# Patient Record
Sex: MALE | Race: BLACK OR AFRICAN AMERICAN | NOT HISPANIC OR LATINO | Employment: FULL TIME | URBAN - METROPOLITAN AREA
[De-identification: names, ages, dates, MRNs, and addresses within clinical notes are randomized per-mention and may not be internally consistent; named-entity substitution may affect disease eponyms.]

---

## 2017-03-29 ENCOUNTER — OFFICE VISIT (OUTPATIENT)
Dept: FAMILY MEDICINE | Facility: CLINIC | Age: 42
End: 2017-03-29
Payer: COMMERCIAL

## 2017-03-29 VITALS
TEMPERATURE: 98 F | WEIGHT: 202.63 LBS | OXYGEN SATURATION: 99 % | SYSTOLIC BLOOD PRESSURE: 118 MMHG | RESPIRATION RATE: 18 BRPM | HEART RATE: 88 BPM | BODY MASS INDEX: 28.37 KG/M2 | HEIGHT: 71 IN | DIASTOLIC BLOOD PRESSURE: 78 MMHG

## 2017-03-29 DIAGNOSIS — J30.89 NON-SEASONAL ALLERGIC RHINITIS DUE TO OTHER ALLERGIC TRIGGER: Primary | ICD-10-CM

## 2017-03-29 DIAGNOSIS — H61.21 IMPACTED CERUMEN OF RIGHT EAR: ICD-10-CM

## 2017-03-29 PROCEDURE — 99213 OFFICE O/P EST LOW 20 MIN: CPT | Mod: S$GLB,,, | Performed by: NURSE PRACTITIONER

## 2017-03-29 PROCEDURE — 1160F RVW MEDS BY RX/DR IN RCRD: CPT | Mod: S$GLB,,, | Performed by: NURSE PRACTITIONER

## 2017-03-29 PROCEDURE — 96372 THER/PROPH/DIAG INJ SC/IM: CPT | Mod: S$GLB,,, | Performed by: NURSE PRACTITIONER

## 2017-03-29 RX ORDER — TRIAMCINOLONE ACETONIDE 40 MG/ML
80 INJECTION, SUSPENSION INTRA-ARTICULAR; INTRAMUSCULAR
Status: COMPLETED | OUTPATIENT
Start: 2017-03-29 | End: 2017-03-29

## 2017-03-29 RX ORDER — FLUTICASONE PROPIONATE 50 MCG
1 SPRAY, SUSPENSION (ML) NASAL DAILY
Qty: 16 G | Refills: 0 | Status: SHIPPED | OUTPATIENT
Start: 2017-03-29 | End: 2018-06-20

## 2017-03-29 RX ORDER — LORATADINE 10 MG/1
10 TABLET ORAL DAILY
Qty: 30 TABLET | Refills: 1 | Status: SHIPPED | OUTPATIENT
Start: 2017-03-29 | End: 2018-06-20

## 2017-03-29 RX ADMIN — TRIAMCINOLONE ACETONIDE 80 MG: 40 INJECTION, SUSPENSION INTRA-ARTICULAR; INTRAMUSCULAR at 12:03

## 2017-03-29 NOTE — MR AVS SNAPSHOT
[facility]  [address] [place] [address]  Phone: [phone]  Fax: [phone]                  Onofre Sims   3/29/2017 11:00 AM   Office Visit    Description:  Male : 1975   Provider:  Lexus Gil NP   Department:  [facility]           Diagnoses this Visit        Comments    Non-seasonal allergic rhinitis due to other allergic trigger    -  Primary     Impacted cerumen of right ear                To Do List           Goals (5 Years of Data)     None       These Medications        Disp Refills Start End    fluticasone (FLONASE) 50 mcg/actuation nasal spray 16 g 0 3/29/2017     1 spray by Each Nare route once daily. - Each Nare    Pharmacy: [facility] [address] [place] [address] Ph #: [phone]       loratadine (CLARITIN) 10 mg tablet 30 tablet 1 3/29/2017 3/29/2018    Take 1 tablet (10 mg total) by mouth once daily. - Oral    Pharmacy: [address] Ph #: [phone]         [facility] -  Assistance  Registered nurses in the [facility] provide clinical advisement, health education, appointment booking, and other advisory services.  Call for this free service at [phone].             Medications           Message regarding Medications     Verify the changes and/or additions to your medication regime listed below are the same as discussed with your clinician today.  If any of these changes or additions are incorrect, please notify your healthcare provider.        START taking these NEW medications        Refills    fluticasone (FLONASE) 50 mcg/actuation nasal spray 0    Si spray by Each Nare route once daily.    Class: Normal    Route: Each Nare    loratadine (CLARITIN) 10 mg tablet 1    Sig: Take 1 tablet (10 mg total) by mouth once daily.    Class: Normal    Route: Oral      These medications were administered today   "      Dose Freq    triamcinolone acetonide injection 80 mg 80 mg Clinic/HOD 1 time    Sig: Inject 2 mLs (80 mg total) into the muscle one time.    Class: Normal    Route: Intramuscular      STOP taking these medications     atorvastatin (LIPITOR) 10 MG tablet Take 1 tablet (10 mg total) by mouth once daily.    azithromycin (Z-WILD) 250 MG tablet Take 2 tablets by mouth on day 1; Take 1 tablet by mouth on days 2-5           Verify that the below list of medications is an accurate representation of the medications you are currently taking.  If none reported, the list may be blank. If incorrect, please contact your healthcare provider. Carry this list with you in case of emergency.           Current Medications     fluticasone (FLONASE) 50 mcg/actuation nasal spray 1 spray by Each Nare route once daily.    loratadine (CLARITIN) 10 mg tablet Take 1 tablet (10 mg total) by mouth once daily.           Clinical Reference Information           Your Vitals Were     BP Pulse Temp Resp Height Weight    118/78 (BP Location: Left arm, Patient Position: Sitting, BP Method: Manual) 88 97.9 °F (36.6 °C) (Oral) 18 5' 11" (1.803 m) 91.9 kg (202 lb 9.6 oz)    SpO2 BMI             99% 28.26 kg/m2         Blood Pressure          Most Recent Value    BP  118/78      Allergies as of 3/29/2017     No Known Allergies      Immunizations Administered on Date of Encounter - 3/29/2017     None      [facility] Sign-Up     Activating your [facility] account is as easy as 1-2-3!     1) Visit [email or web address], select Sign Up Now, enter this activation code and your date of birth, then select Next.  [email or web address]  Expires: 5/13/2017 12:26 PM      2) Create a username and password to use when you visit [facility] in the future and select a security question in case you lose your password and select Next.    3) Enter your e-mail address and click Sign Up!    Additional Information  If you have questions, please e-mail [email or web address] or call " [phone] to talk to our [facility] staff. Remember, [facility] is NOT to be used for urgent needs. For medical emergencies, dial 911.         Language Assistance Services     ATTENTION: Language assistance services are available, free of charge. Please call [phone].      ATENCIÓN: Si baileyla elie, tiene a deluna disposición servicios gratuitos de asistencia lingüística. Llame al [phone].     CHÚ Ý: N?u b?n nói Ti?ng Vi?t, có các d?ch v? h? tr? ngôn ng? mi?n phí dành cho b?n. G?i s? [phone].         [facility] complies with applicable Federal civil rights laws and does not discriminate on the basis of race, color, national origin, age, disability, or sex.

## 2017-03-29 NOTE — LETTER
March 29, 2017      [address] [place] [address]  Phone: [phone]  Fax: [phone]       Patient: Onofre Sims   YOB: 1975  Date of Visit: 03/29/2017    To Whom It May Concern:    Onofre Lopez was at [facility] on 03/29/2017. He may return to work/school on 3/30/17 with no restrictions. If you have any questions or concerns, or if I can be of further assistance, please do not hesitate to contact me.    Sincerely,    Lexus Gil NP

## 2017-03-29 NOTE — PROGRESS NOTES
I attest that this patient was see and evaluated by TOMMY Fisher, then presented to me. I then examined the patient independently and together we agreed on diagnosis and treatment plan which was then presented to the patient See her note dated today for full visit information.         Subjective:        Patient ID: Onofre Sims is a 41 y.o. male.     Chief Complaint: URI     URI    This is a new problem. The current episode started in the past 7 days (3 days ago). The problem has been unchanged. There has been no fever. Associated symptoms include congestion, rhinorrhea, sneezing and a sore throat. Pertinent negatives include no abdominal pain, chest pain, coughing, diarrhea, dysuria, ear pain, headaches, joint swelling, nausea, neck pain, plugged ear sensation, rash, sinus pain, swollen glands, vomiting or wheezing. He has tried antihistamine (benadryl) for the symptoms. The treatment provided mild relief.      Review of Systems   Constitutional: Negative for fatigue and fever.   HENT: Positive for congestion, hearing loss, postnasal drip, rhinorrhea, sneezing and sore throat. Negative for ear discharge and ear pain.   Eyes: Positive for itching.   Respiratory: Negative for cough, shortness of breath and wheezing.   Cardiovascular: Negative for chest pain.   Gastrointestinal: Negative for abdominal pain, constipation, diarrhea, nausea and vomiting.   Genitourinary: Negative for difficulty urinating and dysuria.   Musculoskeletal: Negative for neck pain.   Skin: Negative for rash.   Neurological: Negative for headaches.       Objective:      Physical Exam   Constitutional: He is oriented to person, place, and time. He appears well-developed and well-nourished.   HENT:   Head: Normocephalic and atraumatic.   Left Ear: Tympanic membrane normal.   Nose: Mucosal edema and rhinorrhea present. Right sinus exhibits no maxillary sinus tenderness and no frontal sinus tenderness. Left sinus exhibits no maxillary sinus  tenderness and no frontal sinus tenderness.   Mouth/Throat: Uvula is midline. Posterior oropharyngeal erythema present. No oropharyngeal exudate or posterior oropharyngeal edema.   Right ear occluded with cerumen, unable to visualize TM   Neck: Neck supple. No JVD present.   Cardiovascular: Normal rate and regular rhythm.   Pulmonary/Chest: Effort normal and breath sounds normal.   Lymphadenopathy:   He has no cervical adenopathy.   Neurological: He is alert and oriented to person, place, and time.   Skin: Skin is warm and dry.   Psychiatric: He has a normal mood and affect. His behavior is normal. Judgment and thought content normal.   Vitals reviewed.      Assessment:       1. Non-seasonal allergic rhinitis due to other allergic trigger    2. Impacted cerumen of right ear        Plan:       Onofre was seen today for uri.     Diagnoses and all orders for this visit:     Non-seasonal allergic rhinitis due to other allergic trigger  - fluticasone (FLONASE) 50 mcg/actuation nasal spray; 1 spray by Each Nare route once daily.  - loratadine (CLARITIN) 10 mg tablet; Take 1 tablet (10 mg total) by mouth once daily.  - triamcinolone acetonide injection 80 mg; Inject 2 mLs (80 mg total) into the muscle one time.     Impacted cerumen of right ear     Right ear irrigated; large amount of cerumen removed. TM WNL; patient verbalized being able to hear much better             Electronically signed by Denia Justin at 3/29/2017 11:36 AM   Electronically signed by Denia Justin at 3/29/2017 12:55 PM

## 2017-07-17 ENCOUNTER — OFFICE VISIT (OUTPATIENT)
Dept: FAMILY MEDICINE | Facility: CLINIC | Age: 42
End: 2017-07-17
Payer: COMMERCIAL

## 2017-07-17 VITALS
HEIGHT: 71 IN | DIASTOLIC BLOOD PRESSURE: 82 MMHG | TEMPERATURE: 99 F | BODY MASS INDEX: 28.11 KG/M2 | WEIGHT: 200.81 LBS | SYSTOLIC BLOOD PRESSURE: 122 MMHG | OXYGEN SATURATION: 99 % | HEART RATE: 83 BPM

## 2017-07-17 DIAGNOSIS — D17.1 LIPOMA OF TORSO: Primary | ICD-10-CM

## 2017-07-17 DIAGNOSIS — N48.89 PENIS PAIN: ICD-10-CM

## 2017-07-17 DIAGNOSIS — Z00.00 ANNUAL PHYSICAL EXAM: ICD-10-CM

## 2017-07-17 PROCEDURE — 99214 OFFICE O/P EST MOD 30 MIN: CPT | Mod: S$GLB,,, | Performed by: NURSE PRACTITIONER

## 2017-07-17 RX ORDER — MUPIROCIN 20 MG/G
OINTMENT TOPICAL 3 TIMES DAILY
Qty: 30 G | Refills: 0 | Status: SHIPPED | OUTPATIENT
Start: 2017-07-17 | End: 2018-05-28 | Stop reason: ALTCHOICE

## 2017-07-17 NOTE — PROGRESS NOTES
Subjective:       Patient ID: Onofre Sims is a 41 y.o. male.    Chief Complaint: Annual Exam    Pt presents to the clinic today annual exam. Has paperwork for his insurance that needs to be filled out. Has a lump on the right side of back. Has been there for a year.  Has seen a doctor for it but they wanted to watch it. Cannot recall the MD name thinks it was Dr. Cotter. Denies pain to the area. No drainage. He just knows it there which is bothersome. Also the foreskin over the penis is inflamed. When he urinates the skin burns. Has been hurting since he had sex while his foreskin was wet. The problem is waxing and waning. Will come and go. States he feels the inflammation right now.         Review of Systems   Constitutional: Negative for chills, diaphoresis, fatigue and fever.   Eyes: Negative for photophobia and visual disturbance.   Respiratory: Negative for cough, chest tightness, shortness of breath and wheezing.    Cardiovascular: Negative for chest pain, palpitations and leg swelling.   Genitourinary: Positive for penile pain. Negative for decreased urine volume, dysuria, penile swelling and scrotal swelling.        Inflamed foreskin on penis     Skin: Negative for color change, pallor, rash and wound.        Lump on the right side of back     Neurological: Negative for dizziness, weakness, light-headedness and headaches.       Objective:      Physical Exam   Constitutional: He is oriented to person, place, and time. He appears well-developed and well-nourished. No distress.   HENT:   Head: Normocephalic.   Right Ear: Tympanic membrane and external ear normal.   Left Ear: Tympanic membrane and external ear normal.   Mouth/Throat: No oropharyngeal exudate, posterior oropharyngeal edema or posterior oropharyngeal erythema.   Eyes: Pupils are equal, round, and reactive to light.   Neck: Normal range of motion.   Cardiovascular: Normal rate, regular rhythm and normal heart sounds.    No murmur  heard.  Pulmonary/Chest: Effort normal and breath sounds normal. No respiratory distress. He has no wheezes.   Abdominal: Soft. Bowel sounds are normal. He exhibits no distension. There is no tenderness.   Genitourinary: Penis normal.         Musculoskeletal: Normal range of motion. He exhibits no edema.   Neurological: He is alert and oriented to person, place, and time.   Skin: Skin is warm and dry. He is not diaphoretic.        Psychiatric: He has a normal mood and affect. His behavior is normal. Judgment and thought content normal.   Vitals reviewed.      Assessment:       1. Lipoma of torso    2. Annual physical exam    3. Penis pain        Plan:       Lipoma of torso  -     Ambulatory referral to General Surgery    Annual physical exam  -     CBC auto differential; Future  -     Comprehensive metabolic panel; Future  -     Lipid panel; Future  -     TSH; Future  -     Urinalysis; Future    Penis pain  -     mupirocin (BACTROBAN) 2 % ointment; Apply topically 3 (three) times daily.  Dispense: 30 g; Refill: 0       Will call Dr Cotter to remove lipoma  Fasting blood work tomorrow  Will fax insurance paperwork

## 2017-07-19 ENCOUNTER — TELEPHONE (OUTPATIENT)
Dept: FAMILY MEDICINE | Facility: CLINIC | Age: 42
End: 2017-07-19

## 2017-07-19 DIAGNOSIS — E78.5 HYPERLIPIDEMIA, UNSPECIFIED HYPERLIPIDEMIA TYPE: Primary | ICD-10-CM

## 2017-07-19 LAB
ALBUMIN SERPL-MCNC: 4.6 G/DL (ref 3.6–5.1)
ALBUMIN/GLOB SERPL: 1.5 (CALC) (ref 1–2.5)
ALP SERPL-CCNC: 71 U/L (ref 40–115)
ALT SERPL-CCNC: 16 U/L (ref 9–46)
APPEARANCE UR: CLEAR
AST SERPL-CCNC: 23 U/L (ref 10–40)
BASOPHILS # BLD AUTO: 32 CELLS/UL (ref 0–200)
BASOPHILS NFR BLD AUTO: 0.7 %
BILIRUB SERPL-MCNC: 1 MG/DL (ref 0.2–1.2)
BILIRUB UR QL STRIP: NEGATIVE
BUN SERPL-MCNC: 14 MG/DL (ref 7–25)
BUN/CREAT SERPL: NORMAL (CALC) (ref 6–22)
CALCIUM SERPL-MCNC: 9.3 MG/DL (ref 8.6–10.3)
CHLORIDE SERPL-SCNC: 101 MMOL/L (ref 98–110)
CHOLEST SERPL-MCNC: 278 MG/DL (ref 125–200)
CHOLEST/HDLC SERPL: 7.7 (CALC)
CO2 SERPL-SCNC: 27 MMOL/L (ref 20–31)
COLOR UR: ABNORMAL
CREAT SERPL-MCNC: 1.23 MG/DL (ref 0.6–1.35)
EOSINOPHIL # BLD AUTO: 69 CELLS/UL (ref 15–500)
EOSINOPHIL NFR BLD AUTO: 1.5 %
ERYTHROCYTE [DISTWIDTH] IN BLOOD BY AUTOMATED COUNT: 11.2 % (ref 11–15)
GFR SERPL CREATININE-BSD FRML MDRD: 71 ML/MIN/1.73M2
GLOBULIN SER CALC-MCNC: 3.1 G/DL (CALC) (ref 1.9–3.7)
GLUCOSE SERPL-MCNC: 88 MG/DL (ref 65–99)
GLUCOSE UR QL STRIP: NEGATIVE
HCT VFR BLD AUTO: 37.5 % (ref 38.5–50)
HDLC SERPL-MCNC: 36 MG/DL
HGB BLD-MCNC: 12.6 G/DL (ref 13.2–17.1)
HGB UR QL STRIP: NEGATIVE
KETONES UR QL STRIP: ABNORMAL
LDLC SERPL CALC-MCNC: 222 MG/DL (CALC)
LEUKOCYTE ESTERASE UR QL STRIP: NEGATIVE
LYMPHOCYTES # BLD AUTO: 1219 CELLS/UL (ref 850–3900)
LYMPHOCYTES NFR BLD AUTO: 26.5 %
MCH RBC QN AUTO: 33.2 PG (ref 27–33)
MCHC RBC AUTO-ENTMCNC: 33.6 G/DL (ref 32–36)
MCV RBC AUTO: 98.7 FL (ref 80–100)
MONOCYTES # BLD AUTO: 354 CELLS/UL (ref 200–950)
MONOCYTES NFR BLD AUTO: 7.7 %
NEUTROPHILS # BLD AUTO: 2926 CELLS/UL (ref 1500–7800)
NEUTROPHILS NFR BLD AUTO: 63.6 %
NITRITE UR QL STRIP: NEGATIVE
NONHDLC SERPL-MCNC: 242 MG/DL (CALC)
PH UR STRIP: ABNORMAL [PH] (ref 5–8)
PLATELET # BLD AUTO: 251 THOUSAND/UL (ref 140–400)
PMV BLD REES-ECKER: 10.7 FL (ref 7.5–12.5)
POTASSIUM SERPL-SCNC: 4.2 MMOL/L (ref 3.5–5.3)
PROT SERPL-MCNC: 7.7 G/DL (ref 6.1–8.1)
PROT UR QL STRIP: NEGATIVE
RBC # BLD AUTO: 3.8 MILLION/UL (ref 4.2–5.8)
SODIUM SERPL-SCNC: 136 MMOL/L (ref 135–146)
SP GR UR STRIP: 1.03 (ref 1–1.03)
TRIGL SERPL-MCNC: 99 MG/DL
TSH SERPL-ACNC: 1.51 MIU/L (ref 0.4–4.5)
WBC # BLD AUTO: 4.6 THOUSAND/UL (ref 3.8–10.8)

## 2017-07-19 RX ORDER — ATORVASTATIN CALCIUM 20 MG/1
20 TABLET, FILM COATED ORAL DAILY
Qty: 90 TABLET | Refills: 0 | Status: SHIPPED | OUTPATIENT
Start: 2017-07-19 | End: 2018-06-20 | Stop reason: SDUPTHER

## 2017-07-19 NOTE — TELEPHONE ENCOUNTER
----- Message from Aparna Khan sent at 7/19/2017  8:24 AM CDT -----  Please give patient call with U/A results

## 2017-07-19 NOTE — TELEPHONE ENCOUNTER
Tried to call patient twice no answer. No voicemail. Blood work back everything looks good with the exception of his LDL (bad cholesterol) being elevated. I am going to send him a prescription for cholesterol medicine to the pharmacy. We can recheck the level in three months. Low fat low cholesterol diet and daily exercise. I also filled out the paperwork for his insurance he can pick it up at the . UA was normal

## 2017-07-19 NOTE — TELEPHONE ENCOUNTER
Tried to call patient twice no answer. No voicemail. Blood work back everything looks good with the exception of his LDL (bad cholesterol) being elevated. I am going to send him a prescription for cholesterol medicine to the pharmacy. We can recheck the level in three months. Low fat low cholesterol diet and daily exercise. I also filled out the paperwork for his insurance he can pick it up at the .

## 2017-07-19 NOTE — TELEPHONE ENCOUNTER
Spoke with patient  Blood work back everything looks good with the exception of his LDL (bad cholesterol) being elevated. I am going to send him a prescription for cholesterol medicine to the pharmacy. We can recheck the level in three months. Low fat low cholesterol diet and daily exercise. I also filled out the paperwork for his insurance he can pick it up at the . UA was normal

## 2017-07-25 ENCOUNTER — TELEPHONE (OUTPATIENT)
Dept: FAMILY MEDICINE | Facility: CLINIC | Age: 42
End: 2017-07-25

## 2017-07-25 NOTE — TELEPHONE ENCOUNTER
Spoke with patient states him and his wife had intercourse and she complained of it burning he did have the Bactroban on his penis during the intercourse wants to know if we tested for infection. Let him know we did do a UA which was normal but we did not do STD testing. Let him know if he wanted STD testing I could order it. States if it happens again he will let me know and then go forth with the testing

## 2017-07-25 NOTE — TELEPHONE ENCOUNTER
----- Message from Ashleigh Glass sent at 7/24/2017  4:58 PM CDT -----  Contact: Pt [phone]  Patient requesting a call from Dima

## 2017-07-28 ENCOUNTER — TELEPHONE (OUTPATIENT)
Dept: FAMILY MEDICINE | Facility: CLINIC | Age: 42
End: 2017-07-28

## 2017-07-28 DIAGNOSIS — Z20.2 POSSIBLE EXPOSURE TO STD: Primary | ICD-10-CM

## 2017-07-28 NOTE — TELEPHONE ENCOUNTER
----- Message from Delfina Rose sent at 7/28/2017  9:16 AM CDT -----  Contact: PT  Pt states he had an appt with you last week with wellness check up and issues with his foreskin on penis. Pt states he is using ointment but continue to have problems. Pt is requesting to be screened for STD. Pt would like to speak with Lexus today and also have the labs done today.

## 2017-07-28 NOTE — TELEPHONE ENCOUNTER
STD order in to be done at [facility].. If the problem continues he can follow up with urology

## 2017-08-14 ENCOUNTER — TELEPHONE (OUTPATIENT)
Dept: FAMILY MEDICINE | Facility: CLINIC | Age: 42
End: 2017-08-14

## 2017-12-28 ENCOUNTER — TELEPHONE (OUTPATIENT)
Dept: FAMILY MEDICINE | Facility: CLINIC | Age: 42
End: 2017-12-28

## 2017-12-28 NOTE — TELEPHONE ENCOUNTER
----- Message from Aparna Khan sent at 12/28/2017  8:33 AM CST -----  Patient requesting antibiotic (Zpak). Has symptoms of sore throat, nasal/head congestion. No fever. Started two days ago. Taking OTC Nyquil sinus    [facility]

## 2018-05-28 ENCOUNTER — INITIAL CONSULT (OUTPATIENT)
Dept: OPHTHALMOLOGY | Facility: CLINIC | Age: 43
End: 2018-05-28
Payer: COMMERCIAL

## 2018-05-28 DIAGNOSIS — H18.603 KERATOCONUS OF BOTH EYES: Primary | ICD-10-CM

## 2018-05-28 DIAGNOSIS — H52.213 IRREGULAR ASTIGMATISM OF BOTH EYES: ICD-10-CM

## 2018-05-28 PROCEDURE — 92004 COMPRE OPH EXAM NEW PT 1/>: CPT | Mod: S$GLB,,, | Performed by: OPHTHALMOLOGY

## 2018-05-28 PROCEDURE — 92025 CPTRIZED CORNEAL TOPOGRAPHY: CPT | Mod: S$GLB,,, | Performed by: OPHTHALMOLOGY

## 2018-05-28 PROCEDURE — 99999 PR PBB SHADOW E&M-EST. PATIENT-LVL II: CPT | Mod: PBBFAC,,, | Performed by: OPHTHALMOLOGY

## 2018-05-28 NOTE — Clinical Note
Eli / Binta - pls schedule for CXL.  I placed CXL order - need to f/up and make sure this is routed correctly.

## 2018-05-28 NOTE — Clinical Note
May 31, 2018      Dean Dale MD  [address]  [address]  [place] [address]           [facility]  [address]  [place] [address]  Phone: [phone]  Fax: [phone]          Patient: Onofre Sims   MR Number: [id number]   YOB: 1975   Date of Visit: 5/28/2018       Dear Dr. Dean Dale:    Thank you for referring Onofre Sims to me for evaluation. Attached you will find relevant portions of my assessment and plan of care.    If you have questions, please do not hesitate to call me. I look forward to following Onofre Sims along with you.    Sincerely,    Garett Carter    Enclosure  CC:  No Recipients    If you would like to receive this communication electronically, please contact [email or web address] or [phone] to request more information on [employer] Link access.    For providers and/or their staff who would like to refer a patient to [facility], please contact us through our one-stop-shop provider referral line, Segundo Darnell, at [phone].    If you feel you have received this communication in error or would no longer like to receive these types of communications, please e-mail [email or web address]

## 2018-05-28 NOTE — LETTER
[facility]  [address]  [facility] [address]  Phone: [phone]  Fax: [phone]   June 1, 2018    Dean Dale MD  [address]  [address]  [place] [address]    Patient: Onofre Sims   MR Number: [id number]   YOB: 1975   Date of Visit: 5/28/2018       Dear Dr. Dale:    Thank you for referring Onofre Sims to me for evaluation. Here is my assessment and plan of care:      KERATOCONUS OS>>OD    Reviewed old notes from Dr. Dale's office dating back to 2012.   Agree with assessment that numbers show progression on topography from 2004 to 2009 but no recent evidence of progression on rima.  Some recent changes in cyl of Arx/ Mrx.      Pt tolerating CL well OD, not well OS. Discussed the possibility that CXL OS may allow for better RGP fit - or conversely pt may go on to need PKP OS. Will discuss with Dr. Dale.     The diagnosis of corneal ectasia and its etiology and clinical course were discussed. Treatment with the use of specialty contact lenses, collagen cross linking, and corneal transplantation was explained in detail.     This patient would be a good candidate for now FDA approved epi-off collagen cross linking OD>OS.  R/B/A discussed. Goal is to prevent progression of ectasia.  Pt will be fit with specialty CL after cornea has healed.       Below you will find my full exam findings. If you have questions, please do not hesitate to call me. I look forward to following Mr. Onofre Sims along with you.    Sincerely,        Yoon Strange MD       CC  No Recipients             Base Eye Exam     Visual Acuity (Snellen - Linear)       Right Left    Dist sc CF at 3' CF at 3'    Dist cc 20/40 20/70          Pachymetry       Right Left    Thickness 499 504          Pupils       Pupils APD    Right PERRL None    Left PERRL None          Neuro/Psych     Oriented x3:  Yes    Mood/Affect:  Normal            Slit Lamp and Fundus Exam     Slit Lamp Exam        Right Left    Lids/Lashes Normal Normal    Conjunctiva/Sclera White and quiet White and quiet    Cornea inf steepening inf steepening, striae, tr apical scarring     Anterior Chamber Deep and quiet Deep and quiet    Iris Round and reactive Round and reactive    Lens Clear Clear    Vitreous Normal Normal          Fundus Exam       Right Left    Disc Normal Normal    C/D Ratio 0.5 0.5            Refraction     Wearing Rx       Sphere Cylinder Axis    Right -16.75 +2.50 153    Left -14.25 +4.50 025          Manifest Refraction (Auto)       Sphere Cylinder Axis    Right -14.00 +1.00 146    Left -17.00 +8.00 024

## 2018-06-01 NOTE — PROGRESS NOTES
HPI     Referral dr. Dale    CXL eval for KCN - dx'ed around the age of 28, wears RGPs    Patient states va is about the same. No noticeable changes.    Last edited by Yoon Strange MD on 5/28/2018  9:24 AM. (History)            Assessment /Plan     For exam results, see Encounter Report.    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 Dale's office dating back to 2012.   Agree with assessment that numbers show progression on topography from 2004 to 2009.  Also evidence of recent increase in cyl of Arx/ Mrx.      Pt not tolerating RGP well OS. Discussed the possibility that CXL OS may allow for better RGP fit - or conversely pt may go on to need PKP OS. Will discuss with Dr. Dale.     The diagnosis of corneal ectasia and its etiology and clinical course were discussed. Treatment with the use of specialty contact lenses, collagen cross linking, and corneal transplantation was explained in detail.     This patient would be an excellent candidate for now FDA approved epi-off collagen cross linking OD>OS.  R/B/A discussed. Goal is to prevent progression of ectasia.  Pt will be fit with specialty CL after cornea has healed.     This patient exhibits signs of progression of keratoconus and failure of conservative treatments with spectacles and/or contact lenses.   Disease progression is indicated by:  - An increase of >1D in the Kmax  - An increase of >1D of astigmatism in the MRx  - An increase in myopia of >0.50D on MRx     I recommend treatment with Collagen Crosslinking using the Avedro FDA approved epi-off protocol with Photrexa and the KXL System, in order to stabilize this condition.     Plan:   KXL treatment OS 1st  advanced KCN CCT  499/501     I have informed the patient that we will seek insurance pre-approval, but in case of failure of the  insurance company to cover the cost of this medically necessary procedure, there may be a cost of $2,000 for the patient.

## 2018-06-15 ENCOUNTER — TELEPHONE (OUTPATIENT)
Dept: OPHTHALMOLOGY | Facility: CLINIC | Age: 43
End: 2018-06-15

## 2018-06-15 NOTE — TELEPHONE ENCOUNTER
Called and left 3rd VM. Confirmed we would schedule OS for CXL. Goal is to have CL fit better post procedure. If it does not - may need PKP with dr. Dale    Pt to call back with any other questions for gianna/ syd.

## 2018-06-20 ENCOUNTER — OFFICE VISIT (OUTPATIENT)
Dept: FAMILY MEDICINE | Facility: CLINIC | Age: 43
End: 2018-06-20
Payer: COMMERCIAL

## 2018-06-20 VITALS
SYSTOLIC BLOOD PRESSURE: 118 MMHG | WEIGHT: 201.19 LBS | BODY MASS INDEX: 28.17 KG/M2 | HEIGHT: 71 IN | TEMPERATURE: 99 F | DIASTOLIC BLOOD PRESSURE: 76 MMHG | HEART RATE: 72 BPM | OXYGEN SATURATION: 98 %

## 2018-06-20 DIAGNOSIS — R79.89 ELEVATED LFTS: ICD-10-CM

## 2018-06-20 DIAGNOSIS — E78.5 HYPERLIPIDEMIA, UNSPECIFIED HYPERLIPIDEMIA TYPE: ICD-10-CM

## 2018-06-20 DIAGNOSIS — Z00.00 WELLNESS EXAMINATION: Primary | ICD-10-CM

## 2018-06-20 DIAGNOSIS — H18.603 KERATOCONUS OF BOTH EYES: ICD-10-CM

## 2018-06-20 DIAGNOSIS — D17.1 LIPOMA OF TORSO: ICD-10-CM

## 2018-06-20 PROCEDURE — 99396 PREV VISIT EST AGE 40-64: CPT | Mod: 25,S$GLB,, | Performed by: FAMILY MEDICINE

## 2018-06-20 PROCEDURE — 90471 IMMUNIZATION ADMIN: CPT | Mod: S$GLB,,, | Performed by: FAMILY MEDICINE

## 2018-06-20 PROCEDURE — 90715 TDAP VACCINE 7 YRS/> IM: CPT | Mod: S$GLB,,, | Performed by: FAMILY MEDICINE

## 2018-06-20 RX ORDER — ATORVASTATIN CALCIUM 20 MG/1
20 TABLET, FILM COATED ORAL DAILY
Qty: 90 TABLET | Refills: 1 | Status: SHIPPED | OUTPATIENT
Start: 2018-06-20 | End: 2019-03-21 | Stop reason: SDUPTHER

## 2018-06-20 NOTE — PROGRESS NOTES
Subjective:      Patient ID: Onofre Sims is a 42 y.o. male.    Chief Complaint: Follow-up (wellness)      HPI   Wellness; went to [employer] in [place] sent BC/BS at work at [facility] [employer], told BP and scales were broke and they giovany blood, got results; also, will have a cross link procedure for keratoconus; on labs, resumed statin since labs done with high choleserol; AST/ALT; drinks daiquiris;   W  Review of Systems   Constitutional: Negative for appetite change, fatigue, fever and unexpected weight change.   HENT: Negative for congestion, ear pain, sinus pressure and sore throat.    Eyes: Positive for visual disturbance. Negative for pain.   Respiratory: Negative for shortness of breath.    Cardiovascular: Negative for chest pain.   Gastrointestinal: Negative for abdominal pain, constipation and diarrhea.   Endocrine: Negative for polyuria.   Genitourinary: Negative for difficulty urinating and frequency.   Musculoskeletal: Negative for arthralgias, back pain and myalgias.   Skin: Negative for color change.   Allergic/Immunologic: Negative.    Neurological: Negative for syncope, weakness and headaches.   Hematological: Does not bruise/bleed easily.   Psychiatric/Behavioral: Negative for dysphoric mood and suicidal ideas. The patient is not nervous/anxious.    All other systems reviewed and are negative.    Objective:     Physical Exam   Constitutional: He is oriented to person, place, and time. He appears well-developed and well-nourished. No distress.   HENT:   Head: Normocephalic and atraumatic.   Right Ear: External ear normal.   Left Ear: External ear normal.   Mouth/Throat: Oropharynx is clear and moist. No oropharyngeal exudate.   Eyes: Conjunctivae and EOM are normal. Pupils are equal, round, and reactive to light. Right eye exhibits no discharge. Left eye exhibits no discharge. No scleral icterus.   Neck: Normal range of motion. Neck supple. No JVD present. No tracheal deviation present. No thyromegaly present.    Cardiovascular: Normal rate and regular rhythm.  Exam reveals no gallop and no friction rub.    No murmur heard.  Pulmonary/Chest: Effort normal and breath sounds normal. No stridor. No respiratory distress. He has no wheezes. He has no rales.         He exhibits no tenderness.   Abdominal: Soft. He exhibits no distension and no mass. There is no tenderness. There is no rebound and no guarding.   Musculoskeletal: Normal range of motion. He exhibits no edema or tenderness.   Lymphadenopathy:     He has no cervical adenopathy.   Neurological: He is alert and oriented to person, place, and time. He has normal reflexes. He displays normal reflexes. No cranial nerve deficit. He exhibits normal muscle tone. Coordination normal.   Skin: Skin is warm and dry. No rash noted. He is not diaphoretic. No erythema. No pallor.   Psychiatric: He has a normal mood and affect. His behavior is normal. Judgment and thought content normal.   Nursing note and vitals reviewed.    Assessment:     1. Wellness examination    2. Keratoconus of both eyes    3. Elevated LFTs    4. Hyperlipidemia, unspecified hyperlipidemia type    5. Lipoma of torso      Plan:     New Prescriptions    No medications on file     Discontinued Medications    FLUTICASONE (FLONASE) 50 MCG/ACTUATION NASAL SPRAY    1 spray by Each Nare route once daily.    LORATADINE (CLARITIN) 10 MG TABLET    Take 1 tablet (10 mg total) by mouth once daily.     Modified Medications    Modified Medication Previous Medication    ATORVASTATIN (LIPITOR) 20 MG TABLET atorvastatin (LIPITOR) 20 MG tablet       Take 1 tablet (20 mg total) by mouth once daily.    Take 1 tablet (20 mg total) by mouth once daily.       Wellness examination  -     CBC auto differential; Future; Expected date: 06/20/2018    Keratoconus of both eyes  -     CBC auto differential; Future; Expected date: 06/20/2018    Elevated LFTs  -     Hepatic function panel; Future; Expected date: 07/04/2018  -     CBC auto  differential; Future; Expected date: 06/20/2018    Hyperlipidemia, unspecified hyperlipidemia type  -     atorvastatin (LIPITOR) 20 MG tablet; Take 1 tablet (20 mg total) by mouth once daily.  Dispense: 90 tablet; Refill: 1  -     Lipid panel; Future; Expected date: 09/20/2018  -     CBC auto differential; Future; Expected date: 06/20/2018    Lipoma of torso  Comments:  right mid back, 6 cm on June 20, 2018  Orders:  -     CBC auto differential; Future; Expected date: 06/20/2018    Other orders  -     Tdap Vaccine    lay lacie andersen, repeat lft 2 weeks, repeat lipid 3 months after staying on atorvastatin  To [facility] for lipoma

## 2018-06-25 ENCOUNTER — TELEPHONE (OUTPATIENT)
Dept: OPHTHALMOLOGY | Facility: CLINIC | Age: 43
End: 2018-06-25

## 2018-06-25 NOTE — TELEPHONE ENCOUNTER
----- Message from Yoon Strange MD sent at 6/15/2018  4:44 PM CDT -----  Spoke with this pt. He understands plan. pls schedule so we can determine if insurance will cover. Thanks.    ----- Message -----  From: Eli Benavides MA  Sent: 6/15/2018   3:50 PM  To: Yoon Strange MD    Pt called back.  He wants to speak to you.  He has detailed questions.

## 2018-07-11 ENCOUNTER — TELEPHONE (OUTPATIENT)
Dept: OPHTHALMOLOGY | Facility: CLINIC | Age: 43
End: 2018-07-11

## 2018-07-11 NOTE — TELEPHONE ENCOUNTER
Called pt to schedule CXL procedure.  Pt wanted to know what it is going to cost before scheduling and submitting it to his insurance company because he will be switching over insurances in a few months and doesn't want it submitted twice.  Advised pt that I wasn't sure of a price until it was submitted to see if and how much the insurance covered.  Pt requested to speak to Codie. Sent Codie a message to contact pt and also sent Dr Strange a message with this information.

## 2018-07-26 ENCOUNTER — TELEPHONE (OUTPATIENT)
Dept: OPHTHALMOLOGY | Facility: CLINIC | Age: 43
End: 2018-07-26

## 2018-07-26 NOTE — TELEPHONE ENCOUNTER
----- Message from Agatha Salas sent at 7/26/2018  3:42 PM CDT -----  Contact: Onofre  Mr. Sims needs to have a refit for speciality lens. The only appointments that are available is late September. He can be reached at [phone]

## 2018-07-31 ENCOUNTER — TELEPHONE (OUTPATIENT)
Dept: OPTOMETRY | Facility: CLINIC | Age: 43
End: 2018-07-31

## 2018-07-31 NOTE — TELEPHONE ENCOUNTER
----- Message from Joslyn Platt sent at 7/31/2018 10:03 AM CDT -----  Contact: Onofre Araujo calling to schedule for specialty fit lens appt with Dr. Castrejon.  He has been trying to get scheduled since last Thursday and says no one called him back.  According to the notes from 07/26 Binta was to get with you to schedule.  He can be reached at [phone]

## 2018-08-01 ENCOUNTER — TELEPHONE (OUTPATIENT)
Dept: OPHTHALMOLOGY | Facility: CLINIC | Age: 43
End: 2018-08-01

## 2018-08-01 NOTE — TELEPHONE ENCOUNTER
Received an e-mail relaying patient concern about visit on 5/28/18.  Mr. Sims has stated that he feels he received inadequate service from the physician he saw and wanted to discusses with management.  I called Mr. Sims and listened to his concerns.  He relayed to me that he feels uncomfortable with the exam he received. His top concerns were that his previous records were not available during his exam and that Dr. Strange wanted to schedule surgery for his right eye.  Mr. Sims was told by his previous ophthalmologist that the left eye needed surgery first.  He did not agree with Dr. Strange's advised for right eye first and was feeling very uncomfortable with proceeding with care with Dr. Strange.  I asked how he would like for me to help him move forward with getting the appropriate care.  I advised that we have another corneal specialist on staff that could offer an opinion and possible schedule surgery with Dr. Nunes.  He was agreeable with an appointment with Dr. Nunes and asked for an appointment with Dr. Castrejon for special contact lens fit.  Both appointments were scheduled and Mr. Sims is satisfied with the outcome.

## 2018-08-10 ENCOUNTER — OFFICE VISIT (OUTPATIENT)
Dept: OPTOMETRY | Facility: CLINIC | Age: 43
End: 2018-08-10
Payer: COMMERCIAL

## 2018-08-10 DIAGNOSIS — H52.213 IRREGULAR ASTIGMATISM OF BOTH EYES: ICD-10-CM

## 2018-08-10 DIAGNOSIS — H18.603 KERATOCONUS OF BOTH EYES: Primary | ICD-10-CM

## 2018-08-10 PROCEDURE — 92072 FITG C-LENS KERATOCONUS 1ST: CPT | Mod: S$GLB,,, | Performed by: OPTOMETRIST

## 2018-08-10 PROCEDURE — 99999 PR PBB SHADOW E&M-EST. PATIENT-LVL II: CPT | Mod: PBBFAC,,, | Performed by: OPTOMETRIST

## 2018-08-10 PROCEDURE — 92015 DETERMINE REFRACTIVE STATE: CPT | Mod: S$GLB,,, | Performed by: OPTOMETRIST

## 2018-08-10 PROCEDURE — 92014 COMPRE OPH EXAM EST PT 1/>: CPT | Mod: S$GLB,,, | Performed by: OPTOMETRIST

## 2018-08-10 NOTE — PROGRESS NOTES
HPI     Pt is here for contact lens fit. [facility] Exam  KXL candidate Dr. Nunes  Left eye poor comfort with GPs        Last edited by Jarrett Castrejon, OD on 8/10/2018  3:04 PM. (History)            Assessment /Plan     For exam results, see Encounter Report.    Keratoconus of both eyes  Irregular astigmatism of both eyes  Eyeglass Final Rx     Eyeglass Final Rx       Sphere    Right NONE    Left NONE              Contact Lens Final Rx     Final Contact Lens Rx       Brand Base Curve Diameter Sphere Cylinder Lens Addl. Specs    Right Synergeyes VS 8.4 16.0 -0.50 Sphere 3600 36-42 Menicon Z    Left Synergeyes VS 8.4 16.0 -4.25 Sphere 3800 36-42 Menicon Z    Comments:  This is not a final prescription.  This is specialty order contact lens that requires follow up care and warranty adjustment.                     RTC dispense and train

## 2018-08-10 NOTE — LETTER
August 10, 2018      Justin Nunes MD  [address]  [address]  [facility] [address]           [facility]  [address]  [facility] [address]  Phone: [phone]  Fax: [phone]          Patient: Onofre Sims   MR Number: [id number]   YOB: 1975   Date of Visit: 8/10/2018       Dear Dr. Justin Nunes:    Thank you for referring Onofre Sims to me for evaluation. Attached you will find relevant portions of my assessment and plan of care.    If you have questions, please do not hesitate to call me. I look forward to following Onofre Sims along with you.    Sincerely,    Jarrett Castrejon, OD    Enclosure  CC:  No Recipients    If you would like to receive this communication electronically, please contact [email or web address] or [phone] to request more information on [employer] access.    For providers and/or their staff who would like to refer a patient to [facility], please contact us through our one-stop-shop provider referral line, [facility], at [phone].    If you feel you have received this communication in error or would no longer like to receive these types of communications, please e-mail [email or web address]

## 2018-08-15 NOTE — MEDICAL/APP STUDENT
Subjective:       Patient ID: Onofre Sims is a 41 y.o. male.    Chief Complaint: URI    URI    This is a new problem. The current episode started in the past 7 days (3 days ago). The problem has been unchanged. There has been no fever. Associated symptoms include congestion, rhinorrhea, sneezing and a sore throat. Pertinent negatives include no abdominal pain, chest pain, coughing, diarrhea, dysuria, ear pain, headaches, joint swelling, nausea, neck pain, plugged ear sensation, rash, sinus pain, swollen glands, vomiting or wheezing. He has tried antihistamine (benadryl) for the symptoms. The treatment provided mild relief.     Review of Systems   Constitutional: Negative for fatigue and fever.   HENT: Positive for congestion, hearing loss, postnasal drip, rhinorrhea, sneezing and sore throat. Negative for ear discharge and ear pain.    Eyes: Positive for itching.   Respiratory: Negative for cough, shortness of breath and wheezing.    Cardiovascular: Negative for chest pain.   Gastrointestinal: Negative for abdominal pain, constipation, diarrhea, nausea and vomiting.   Genitourinary: Negative for difficulty urinating and dysuria.   Musculoskeletal: Negative for neck pain.   Skin: Negative for rash.   Neurological: Negative for headaches.       Objective:      Physical Exam   Constitutional: He is oriented to person, place, and time. He appears well-developed and well-nourished.   HENT:   Head: Normocephalic and atraumatic.   Left Ear: Tympanic membrane normal.   Nose: Mucosal edema and rhinorrhea present. Right sinus exhibits no maxillary sinus tenderness and no frontal sinus tenderness. Left sinus exhibits no maxillary sinus tenderness and no frontal sinus tenderness.   Mouth/Throat: Uvula is midline. Posterior oropharyngeal erythema present. No oropharyngeal exudate or posterior oropharyngeal edema.   Right ear occluded with cerumen, unable to visualize TM   Neck: Neck supple. No JVD present.   Cardiovascular:  Will wait for lab results before treatments   Normal rate and regular rhythm.    Pulmonary/Chest: Effort normal and breath sounds normal.   Lymphadenopathy:     He has no cervical adenopathy.   Neurological: He is alert and oriented to person, place, and time.   Skin: Skin is warm and dry.   Psychiatric: He has a normal mood and affect. His behavior is normal. Judgment and thought content normal.   Vitals reviewed.      Assessment:       1. Non-seasonal allergic rhinitis due to other allergic trigger    2. Impacted cerumen of right ear        Plan:       Onofre was seen today for uri.    Diagnoses and all orders for this visit:    Non-seasonal allergic rhinitis due to other allergic trigger  -     fluticasone (FLONASE) 50 mcg/actuation nasal spray; 1 spray by Each Nare route once daily.  -     loratadine (CLARITIN) 10 mg tablet; Take 1 tablet (10 mg total) by mouth once daily.  -     triamcinolone acetonide injection 80 mg; Inject 2 mLs (80 mg total) into the muscle one time.    Impacted cerumen of right ear    Right ear irrigated; large amount of cerumen removed. TM WNL; patient verbalized being able to hear much better

## 2018-09-04 ENCOUNTER — INITIAL CONSULT (OUTPATIENT)
Dept: OPHTHALMOLOGY | Facility: CLINIC | Age: 43
End: 2018-09-04
Attending: OPHTHALMOLOGY
Payer: COMMERCIAL

## 2018-09-04 DIAGNOSIS — H18.603 KERATOCONUS OF BOTH EYES: Primary | ICD-10-CM

## 2018-09-04 PROCEDURE — 92014 COMPRE OPH EXAM EST PT 1/>: CPT | Mod: S$GLB,,, | Performed by: OPHTHALMOLOGY

## 2018-09-04 PROCEDURE — 99999 PR PBB SHADOW E&M-EST. PATIENT-LVL II: CPT | Mod: PBBFAC,,, | Performed by: OPHTHALMOLOGY

## 2018-09-04 NOTE — Clinical Note
September 4, 2018      Dean Dale MD  [address]  [address]  [place] [address]           [facility]  [address]  [facility] [address]  Phone: [phone]  Fax: [phone]          Patient: Onofre Sims   MR Number: [id number]   YOB: 1975   Date of Visit: 9/4/2018       Dear Dr. Dean Dale:    Thank you for referring Onofre Sims to me for evaluation. Attached you will find relevant portions of my assessment and plan of care.    If you have questions, please do not hesitate to call me. I look forward to following Onofre Sims along with you.    Sincerely,    Justin Nunes MD    Enclosure  CC:  No Recipients    If you would like to receive this communication electronically, please contact [email or web address] or [phone] to request more information on [employer] Link access.    For providers and/or their staff who would like to refer a patient to [facility], please contact us through our one-stop-shop provider referral line, [facility], at [phone].    If you feel you have received this communication in error or would no longer like to receive these types of communications, please e-mail [email or web address]

## 2018-09-04 NOTE — Clinical Note
Please clear his billing from his first visit with Dr Strange, so that he only receives one charge for these two visits.

## 2018-09-04 NOTE — LETTER
[facility] ([facility]  Ophthalmology  [address]  [facility] [address]  Phone: [phone]  Fax: [phone]   September 4, 2018    Dean Dale MD  [address]  [facility] [address]    Patient: Onofre Sims   MR Number: [id number]   YOB: 1975   Date of Visit: 9/4/2018     Dear Dr. Dale :    Thank you for referring Onofre Sims to me for evaluation. Here is my assessment and plan of care:    Keratoconus of both eyes    The diagnosis of keratoconus and its etiology and clinical course were discussed.  Treatment with the use of specialty contact lenses, collagen cross linking, and corneal transplantation was explained in detail.    This patient exhibits minimal signs of progression of keratoconus over the past 3-4 years.    I recommend consider treatment with Collagen Crosslinking using the Avedro FDA approved epi-off protocol with Photrexa and the KXL System, in order to stabilize this condition.  I have informed the patient that we will seek insurance pre-approval, but in case of failure of the insurance company to cover the cost of this medically necessary procedure, there may be a cost of $2,000 for the patient.    Plan:   KXL treatment OS then OD.  336/447  Discussed possibility of careful monitoring for progression OD as pt is 41 y/o and insurance coverage is unlikely.  He wished to monitor at this time, get insurance issues shored up, and consider CXL treatment in the future.     If you have questions, please do not hesitate to call me. I look forward to following Onofre Sims along with you.    Sincerely,    Justin Nunes MD       CC  No Recipients

## 2018-09-04 NOTE — PROGRESS NOTES
HPI     Referred by Dr. Dale    Patient here for a CXL evaluation. Patient states he is here for a 2nd   opinion about is Keratoconus. He says he was dx'd in his 20's. Denies   pain.     No eye meds this time.     Last edited by Karrie Centeno on 9/4/2018 10:50 AM. (History)            Assessment /Plan     For exam results, see Encounter Report.    Keratoconus of both eyes        The diagnosis of keratoconus and its etiology and clinical course were discussed.  Treatment with the use of specialty contact lenses, collagen cross linking, and corneal transplantation was explained in detail.    This patient exhibits minimal signs of progression of keratoconus over the past 3-4 years.    I recommend consider treatment with Collagen Crosslinking using the Avedro FDA approved epi-off protocol with Photrexa and the KXL System, in order to stabilize this condition.  I have informed the patient that we will seek insurance pre-approval, but in case of failure of the insurance company to cover the cost of this medically necessary procedure, there may be a cost of $2,000 for the patient.    Plan:   KXL treatment OS then OD.  336/447  Discussed possibility of careful monitoring for progression OD as pt is 43 y/o and insurance coverage is unlikely.  He wished to monitor at this time, get insurance issues shored up, and consider CXL treatment in the future.

## 2018-10-12 ENCOUNTER — OFFICE VISIT (OUTPATIENT)
Dept: OPTOMETRY | Facility: CLINIC | Age: 43
End: 2018-10-12
Payer: COMMERCIAL

## 2018-10-12 DIAGNOSIS — H52.213 IRREGULAR ASTIGMATISM OF BOTH EYES: Primary | ICD-10-CM

## 2018-10-12 PROCEDURE — 99499 UNLISTED E&M SERVICE: CPT | Mod: S$GLB,,, | Performed by: OPTOMETRIST

## 2018-10-12 PROCEDURE — 92499 UNLISTED OPH SVC/PROCEDURE: CPT | Mod: ,,, | Performed by: OPTOMETRIST

## 2018-10-12 NOTE — PROGRESS NOTES
HPI     Dispense and train with Yessica Shaw comfort OD, lens awareness OS    Last edited by Jarrett Castrejon, OD on 10/12/2018  3:14 PM. (History)            Assessment /Plan     For exam results, see Encounter Report.    Irregular astigmatism of both eyes  --Ok to dispense Scleral CLs  Cleaning: ClearCare Plus        Disinfection and Storage: ClearCare Plus  Rinsing:  Purilens  Lens Haskins: Purilens  Rewetting: Blink Contact Lenses     Wearing Time Schedule Day 1        3-4 hours    2  Up to 6 hours    3  Up to 8 hours    4  Up to 10 hours    5  Up to 10 hours max          RTC 1 wk                 
Statement Selected

## 2018-10-19 ENCOUNTER — OFFICE VISIT (OUTPATIENT)
Dept: OPTOMETRY | Facility: CLINIC | Age: 43
End: 2018-10-19
Payer: COMMERCIAL

## 2018-10-19 DIAGNOSIS — H18.603 KERATOCONUS OF BOTH EYES: Primary | ICD-10-CM

## 2018-10-19 PROCEDURE — 92499 UNLISTED OPH SVC/PROCEDURE: CPT | Mod: ,,, | Performed by: OPTOMETRIST

## 2018-10-19 PROCEDURE — 99499 UNLISTED E&M SERVICE: CPT | Mod: S$GLB,,, | Performed by: OPTOMETRIST

## 2018-10-19 NOTE — PROGRESS NOTES
HPI     Pt states that OD isnt 'sharp' . Pt is not complaining of any irritation   or discomfort of the CLs.     Last edited by Maria E Shah on 10/19/2018  9:09 AM. (History)            Assessment /Plan     For exam results, see Encounter Report.    Keratoconus of both eyes  Contact Lens Final Rx     Final Contact Lens Rx       Brand Base Curve Diameter Sphere Cylinder Lens Addl. Specs    Right Synergeyes VS 8.4 16.0 +0.25 Sphere 3600 36-42 Menicon Z    Left           Comments:  Order remake                Remake above with warranty      RTC 1 yr

## 2018-10-31 ENCOUNTER — TELEPHONE (OUTPATIENT)
Dept: OPTOMETRY | Facility: CLINIC | Age: 43
End: 2018-10-31

## 2018-11-16 ENCOUNTER — OFFICE VISIT (OUTPATIENT)
Dept: OPTOMETRY | Facility: CLINIC | Age: 43
End: 2018-11-16
Payer: COMMERCIAL

## 2018-11-16 DIAGNOSIS — H18.603 KERATOCONUS OF BOTH EYES: Primary | ICD-10-CM

## 2018-11-16 PROCEDURE — 92499 UNLISTED OPH SVC/PROCEDURE: CPT | Mod: ,,, | Performed by: OPTOMETRIST

## 2018-11-16 PROCEDURE — 99499 UNLISTED E&M SERVICE: CPT | Mod: S$GLB,,, | Performed by: OPTOMETRIST

## 2018-11-16 NOTE — PROGRESS NOTES
HPI     Pt states contacts feels ok.    Last edited by Maria E Shah on 11/16/2018  1:48 PM. (History)            Assessment /Plan     For exam results, see Encounter Report.    Keratoconus of both eyes  -OOK FTW    RTC 1 yr

## 2018-12-21 ENCOUNTER — TELEPHONE (OUTPATIENT)
Dept: OPTOMETRY | Facility: CLINIC | Age: 43
End: 2018-12-21

## 2018-12-21 NOTE — TELEPHONE ENCOUNTER
Pt states that comfort level of new contacts is great but his va is better with old RPG lenses. Wants to know if something can be done prescription wise to get va clear like old contacts. Let patient know I would give him a call back before the end of the day to let him know if we can get him earlier than 02/01----- Message from Promise Rashid sent at 12/21/2018  9:51 AM CST -----  Contact: Onofre  Needs Advice    Reason for call:Pt called to f/u on getting scheduled for specialty contact lens appt. There are vision concerns with the lenses,        Communication Preference:[phone]    Additional Information:

## 2018-12-21 NOTE — TELEPHONE ENCOUNTER
Scheduled appointment for 01/04 3:30  ----- Message from Jarrett Castrejon OD sent at 12/21/2018  2:12 PM CST -----  Contact: Onofre  There is a 3:30 on 1/4      ----- Message -----  From: Maria E Shah  Sent: 12/21/2018   1:28 PM  To: Jarrett Castrejon OD    Pt states that comfort of new lens is great but va is not as clear as old contacts. Wants to know what can be done to get the va like the old RGP. Due to the issue that he is having Does patient have to come to [facility] location? Next available is 02/01  ----- Message -----  From: Promise Rashid  Sent: 12/21/2018   9:51 AM  To: Lucía Farias Staff    Needs Advice    Reason for call:Pt called to f/u on getting scheduled for specialty contact lens appt. There are vision concerns with the lenses,        Communication Preference:[phone]    Additional Information:

## 2019-01-04 ENCOUNTER — OFFICE VISIT (OUTPATIENT)
Dept: URGENT CARE | Facility: CLINIC | Age: 44
End: 2019-01-04
Payer: COMMERCIAL

## 2019-01-04 ENCOUNTER — OFFICE VISIT (OUTPATIENT)
Dept: OPTOMETRY | Facility: CLINIC | Age: 44
End: 2019-01-04
Payer: COMMERCIAL

## 2019-01-04 VITALS
TEMPERATURE: 98 F | WEIGHT: 200 LBS | HEART RATE: 57 BPM | OXYGEN SATURATION: 99 % | DIASTOLIC BLOOD PRESSURE: 95 MMHG | HEIGHT: 71 IN | BODY MASS INDEX: 28 KG/M2 | RESPIRATION RATE: 18 BRPM | SYSTOLIC BLOOD PRESSURE: 140 MMHG

## 2019-01-04 DIAGNOSIS — R51.9 SINUS HEADACHE: ICD-10-CM

## 2019-01-04 DIAGNOSIS — J06.9 VIRAL URI: Primary | ICD-10-CM

## 2019-01-04 DIAGNOSIS — H18.603 KERATOCONUS OF BOTH EYES: Primary | ICD-10-CM

## 2019-01-04 PROCEDURE — 99499 NO LOS: ICD-10-PCS | Mod: S$GLB,,, | Performed by: OPTOMETRIST

## 2019-01-04 PROCEDURE — 99499 UNLISTED E&M SERVICE: CPT | Mod: S$GLB,,, | Performed by: OPTOMETRIST

## 2019-01-04 PROCEDURE — 99214 OFFICE O/P EST MOD 30 MIN: CPT | Mod: S$GLB,,, | Performed by: NURSE PRACTITIONER

## 2019-01-04 PROCEDURE — 99214 PR OFFICE/OUTPT VISIT, EST, LEVL IV, 30-39 MIN: ICD-10-PCS | Mod: S$GLB,,, | Performed by: NURSE PRACTITIONER

## 2019-01-04 PROCEDURE — 3008F PR BODY MASS INDEX (BMI) DOCUMENTED: ICD-10-PCS | Mod: CPTII,S$GLB,, | Performed by: NURSE PRACTITIONER

## 2019-01-04 PROCEDURE — 3008F BODY MASS INDEX DOCD: CPT | Mod: CPTII,S$GLB,, | Performed by: NURSE PRACTITIONER

## 2019-01-04 RX ORDER — FLUTICASONE PROPIONATE 50 MCG
1 SPRAY, SUSPENSION (ML) NASAL DAILY
Qty: 1 BOTTLE | Refills: 0 | Status: SHIPPED | OUTPATIENT
Start: 2019-01-04 | End: 2023-01-12

## 2019-01-04 NOTE — PROGRESS NOTES
HPI     Pt states that he can see better with old OD lens than with current     Last edited by Maria E Shah on 1/4/2019  3:24 PM. (History)            Assessment /Plan     For exam results, see Encounter Report.    Keratoconus of both eyes  .  Contact Lens Final Rx     Final Contact Lens Rx       Brand Base Curve Diameter Sphere Cylinder Axis Lens Addl. Specs    Right Synergeyes VS 8.4 16.0 +0.50 -1.25 120 3600 36-42 Menicon Z    Left            Comments:  Toric marker at 125    Order remake    This is not a final prescription.  This is specialty order contact lens that requires follow up care and warranty adjustment.                   RTC dispense

## 2019-01-04 NOTE — PROGRESS NOTES
"Subjective:       Patient ID: Onofre Sims is a 43 y.o. male.    Vitals:  height is 5' 11" (1.803 m) and weight is 90.7 kg (200 lb). His oral temperature is 98.4 °F (36.9 °C). His blood pressure is 140/95 (abnormal) and his pulse is 57 (abnormal). His respiration is 18 and oxygen saturation is 99%.     Chief Complaint: Hypertension    This is a 43 y.o. male who presents today with a chief complaint of elevated blood pressure today. His readings were 139/91 and later at [facility] 159/111. Not on blood pressure medication. He woke up with a headache, runny nose and scratchy throat. He took Aspirin 325 mg and headache subsided but then returned. Dr. Theodore Jamison in [place] is PCP.    No previous Hx of HTN. Advised to follow up with his PCP and discuss and that B/P can change throughout day. Denies weakness, dizziness, chest pain, focal weakness or shortness of breath.    Some sinus congestion and frontal headache intermittently x 2 days.      Hypertension   This is a chronic problem. The current episode started today. The problem has been waxing and waning since onset. Associated symptoms include headaches. Pertinent negatives include no blurred vision, chest pain, palpitations or shortness of breath. Past treatments include nothing.       Constitution: Negative for chills, fatigue and fever.   HENT: Positive for congestion, sinus pain and sinus pressure. Negative for sore throat.    Neck: Negative for painful lymph nodes.   Cardiovascular: Negative for chest pain, leg swelling and palpitations.   Eyes: Negative for double vision and blurred vision.   Respiratory: Negative for cough and shortness of breath.    Gastrointestinal: Negative for nausea, vomiting and diarrhea.   Genitourinary: Negative for dysuria, frequency and urgency.   Musculoskeletal: Negative for joint pain, joint swelling, muscle cramps and muscle ache.   Skin: Negative for color change, pale and rash.   Allergic/Immunologic: Negative for seasonal " allergies and flu shot.   Neurological: Positive for headaches. Negative for dizziness, history of vertigo, light-headedness, passing out and altered mental status.   Hematologic/Lymphatic: Negative for swollen lymph nodes, easy bruising/bleeding and history of blood clots. Does not bruise/bleed easily.   Psychiatric/Behavioral: Negative for altered mental status, nervous/anxious, sleep disturbance and depression. The patient is not nervous/anxious.        Objective:      Physical Exam   Constitutional: He is oriented to person, place, and time. He appears well-developed and well-nourished. He is cooperative.  Non-toxic appearance. He does not appear ill. No distress.   HENT:   Head: Normocephalic and atraumatic.   Right Ear: Hearing, tympanic membrane, external ear and ear canal normal.   Left Ear: Hearing, tympanic membrane, external ear and ear canal normal.   Nose: Rhinorrhea present. No mucosal edema or nasal deformity. No epistaxis. Right sinus exhibits no maxillary sinus tenderness and no frontal sinus tenderness. Left sinus exhibits no maxillary sinus tenderness and no frontal sinus tenderness.   Mouth/Throat: Uvula is midline, oropharynx is clear and moist and mucous membranes are normal. No trismus in the jaw. Normal dentition. No uvula swelling. No posterior oropharyngeal erythema.   Eyes: Conjunctivae and lids are normal. No scleral icterus.   Sclera clear bilat   Neck: Trachea normal, full passive range of motion without pain and phonation normal. Neck supple.   Cardiovascular: Normal rate, regular rhythm, S1 normal, S2 normal, normal heart sounds, intact distal pulses and normal pulses. Exam reveals no gallop.   No murmur heard.  Pulmonary/Chest: Effort normal and breath sounds normal. No stridor. No respiratory distress. He has no decreased breath sounds. He has no wheezes.   Abdominal: Soft. Normal appearance and bowel sounds are normal. He exhibits no distension. There is no tenderness.    Musculoskeletal: Normal range of motion. He exhibits no edema or deformity.   Neurological: He is alert and oriented to person, place, and time. He exhibits normal muscle tone. Coordination normal.   Skin: Skin is warm, dry and intact. He is not diaphoretic. No pallor.   Psychiatric: He has a normal mood and affect. His speech is normal and behavior is normal. Judgment and thought content normal. Cognition and memory are normal.   Nursing note and vitals reviewed.      Assessment:       1. Viral URI    2. Sinus headache        Plan:         Viral URI    Sinus headache    Other orders  -     fluticasone (FLONASE) 50 mcg/actuation nasal spray; 1 spray (50 mcg total) by Each Nare route once daily.  Dispense: 1 Bottle; Refill: 0      Patient Instructions     You may continue taking Tylenol (acetaminophen) or ibuprofen for pain and fever.      Viral Upper Respiratory Illness (Adult)  You have a viral upper respiratory illness (URI), which is another term for the common cold. This illness is contagious during the first few days. It is spread through the air by coughing and sneezing. It may also be spread by direct contact (touching the sick person and then touching your own eyes, nose, or mouth). Frequent handwashing will decrease risk of spread. Most viral illnesses go away within 7 to 10 days with rest and simple home remedies. Sometimes the illness may last for several weeks. Antibiotics will not kill a virus, and they are generally not prescribed for this condition.    Home care  · If symptoms are severe, rest at home for the first 2 to 3 days. When you resume activity, don't let yourself get too tired.  · Avoid being exposed to cigarette smoke (yours or others).  · You may use acetaminophen or ibuprofen to control pain and fever, unless another medicine was prescribed. (Note: If you have chronic liver or kidney disease, have ever had a stomach ulcer or gastrointestinal bleeding, or are taking blood-thinning  medicines, talk with your healthcare provider before using these medicines.) Aspirin should never be given to anyone under 18 years of age who is ill with a viral infection or fever. It may cause severe liver or brain damage.  · Your appetite may be poor, so a light diet is fine. Avoid dehydration by drinking 6 to 8 glasses of fluids per day (water, soft drinks, juices, tea, or soup). Extra fluids will help loosen secretions in the nose and lungs.  · Over-the-counter cold medicines will not shorten the length of time youre sick, but they may be helpful for the following symptoms: cough, sore throat, and nasal and sinus congestion. (Note: Do not use decongestants if you have high blood pressure.)  Follow-up care  Follow up with your healthcare provider, or as advised.  When to seek medical advice  Call your healthcare provider right away if any of these occur:  · Cough with lots of colored sputum (mucus)  · Severe headache; face, neck, or ear pain  · Difficulty swallowing due to throat pain  · Fever of 100.4°F (38°C)  Call 911, or get immediate medical care  Call emergency services right away if any of these occur:  · Chest pain, shortness of breath, wheezing, or difficulty breathing  · Coughing up blood  · Inability to swallow due to throat pain  Date Last Reviewed: 9/13/2015 © 2000-2017 [employer]. [address] [place] [address], [place] [address]. All rights reserved. This information is not intended as a substitute for professional medical care. Always follow your healthcare professional's instructions.      Sinus Headache    The sinuses are air-filled spaces within the bones of the face. They connect to the inside of the nose. Sinusitis is an inflammation of the tissue lining the sinus cavity. Sinus inflammation can occur during a cold or hay fever (allergies to pollens and other particles in the air) and cause symptoms of sinus congestion and fullness and perhaps a low-grade fever. An infection is  usually present when there is also facial pain or headache and green or yellow drainage from the nose or into the back of the throat (postnasal drip). Antibiotics are often prescribed to treat this condition.  Sinus headache may cause pain in different places, depending on which sinuses are infected. There may be pain in the temples, forehead, top of the head, behind or around the eye, across the cheekbone, or into the upper teeth.  You may find that changing your position, sitting upright or lying down, will bring some relief.  Home care  The following guidelines will help you care for yourself at home:  · Drink plenty of water, hot tea, and other liquids to stay well hydrated. This thins the mucus and helps your sinuses drain.  · Apply heat to the painful areas of the face. Use a towel soaked in hot water. Or  the shower with the hot spray on your face. This is a good way to inhale warm water vapor and get heat on your face at the same time. Cover your mouth and nose with your hands so you can still breathe as you do this.  · Use a cool mist vaporizer at night. Suck on peppermint, menthol, or eucalyptus hard candies during the day.  · An expectorant containing guaifenesin helps thin the mucus. It also helps your sinuses drain.  · You may use over-the-counter decongestants unless a similar medicine was prescribed. Nasal sprays or drops work the fastest. Use one that contains phenylephrine or oxymetazoline. First blow your nose gently to remove mucus. Then apply the spray or drops. Don't use decongestant nasal sprays or drops more often than the label says or for more than 3 days. This can make symptoms worse. Nasal sprays or drops prescribed by your doctor typically do not have these limits. Check with your doctor or pharmacist. You may also use oral tablets containing pseudoephedrine. Side effects from oral decongestants tend to be worse than with nasal sprays or drops, and may keep you from using them.  Many sinus remedies combine ingredients, which may increase side effects. Also, if you are taking a combination medicine with another medicine, be sure you are not taking a double dose of anything by mistake. Read the labels or ask the pharmacist for help. Talk with your doctor before using decongestants if you have high blood pressure, heart disease, glaucoma, or prostate trouble.  · Antihistamines may help if allergies are causing your sinusitis. You can get chlorpheniramine and diphenhydramine over the counter, but these can cause drowsiness. Don't use these if you have glaucoma or if you are a man with trouble urinating due to an enlarged prostate. Over-the-counter antihistamines containing loratidine and cetirizine cause less drowsiness and may be a better choice for daytime use.  · When allergies cause your sinusitis, a saline nasal rinse may give relief. A saline nasal rinse reduces swelling and clears excess mucus. This allows sinuses to drain. Prepackaged kits are available at most drugstores. These contain premixed salt packets and an irrigation device. If antibiotics have been prescribed to treat an acute sinus infection, talk with your doctor before using a nasal rinse to be sure it is safe for you.  · You may use over-the-counter medicine to control pain and fever, unless another pain medicine was prescribed. Talk with your doctor before using acetaminophen or ibuprofen if you have chronic liver or kidney disease. Also talk with your doctor if you have ever had a stomach ulcer. Aspirin should never be used in anyone under 18 years of age who has a fever. It may cause a life-threatening condition called Reye syndrome.  · If antibiotics were given, finish all of them, even if you are feeling better after a few days.  Follow-up care  Follow up with your healthcare provider, or as advised if your symptoms aren't better in 1 week.  Call 911  Call 911 if any of these occur:  · Unusual drowsiness or  confusion  · Swelling of the forehead or eyelids  · Vision problems including blurred or double vision  · Seizure  When to seek medical advice  Call your healthcare provider right away if any of these occur:  · Facial pain or headache becomes more severe  · Stiff neck  · Fever over 100.4º F (38.0º C) for more than 3 days on antibiotics  · Bleeding from the nose or throat  Date Last Reviewed: 10/1/2016  © 2615-2316 [employer]. [address] [place] [address], [place] [address]. All rights reserved. This information is not intended as a substitute for professional medical care. Always follow your healthcare professional's instructions.

## 2019-01-05 NOTE — PATIENT INSTRUCTIONS
You may continue taking Tylenol (acetaminophen) or ibuprofen for pain and fever.      Viral Upper Respiratory Illness (Adult)  You have a viral upper respiratory illness (URI), which is another term for the common cold. This illness is contagious during the first few days. It is spread through the air by coughing and sneezing. It may also be spread by direct contact (touching the sick person and then touching your own eyes, nose, or mouth). Frequent handwashing will decrease risk of spread. Most viral illnesses go away within 7 to 10 days with rest and simple home remedies. Sometimes the illness may last for several weeks. Antibiotics will not kill a virus, and they are generally not prescribed for this condition.    Home care  · If symptoms are severe, rest at home for the first 2 to 3 days. When you resume activity, don't let yourself get too tired.  · Avoid being exposed to cigarette smoke (yours or others).  · You may use acetaminophen or ibuprofen to control pain and fever, unless another medicine was prescribed. (Note: If you have chronic liver or kidney disease, have ever had a stomach ulcer or gastrointestinal bleeding, or are taking blood-thinning medicines, talk with your healthcare provider before using these medicines.) Aspirin should never be given to anyone under 18 years of age who is ill with a viral infection or fever. It may cause severe liver or brain damage.  · Your appetite may be poor, so a light diet is fine. Avoid dehydration by drinking 6 to 8 glasses of fluids per day (water, soft drinks, juices, tea, or soup). Extra fluids will help loosen secretions in the nose and lungs.  · Over-the-counter cold medicines will not shorten the length of time youre sick, but they may be helpful for the following symptoms: cough, sore throat, and nasal and sinus congestion. (Note: Do not use decongestants if you have high blood pressure.)  Follow-up care  Follow up with your healthcare provider, or as  advised.  When to seek medical advice  Call your healthcare provider right away if any of these occur:  · Cough with lots of colored sputum (mucus)  · Severe headache; face, neck, or ear pain  · Difficulty swallowing due to throat pain  · Fever of 100.4°F (38°C)  Call 911, or get immediate medical care  Call emergency services right away if any of these occur:  · Chest pain, shortness of breath, wheezing, or difficulty breathing  · Coughing up blood  · Inability to swallow due to throat pain  Date Last Reviewed: 9/13/2015 © 2000-2017 [employer]. [address] [place] [address]. All rights reserved. This information is not intended as a substitute for professional medical care. Always follow your healthcare professional's instructions.      Sinus Headache    The sinuses are air-filled spaces within the bones of the face. They connect to the inside of the nose. Sinusitis is an inflammation of the tissue lining the sinus cavity. Sinus inflammation can occur during a cold or hay fever (allergies to pollens and other particles in the air) and cause symptoms of sinus congestion and fullness and perhaps a low-grade fever. An infection is usually present when there is also facial pain or headache and green or yellow drainage from the nose or into the back of the throat (postnasal drip). Antibiotics are often prescribed to treat this condition.  Sinus headache may cause pain in different places, depending on which sinuses are infected. There may be pain in the temples, forehead, top of the head, behind or around the eye, across the cheekbone, or into the upper teeth.  You may find that changing your position, sitting upright or lying down, will bring some relief.  Home care  The following guidelines will help you care for yourself at home:  · Drink plenty of water, hot tea, and other liquids to stay well hydrated. This thins the mucus and helps your sinuses drain.  · Apply heat to the painful areas  of the face. Use a towel soaked in hot water. Or  the shower with the hot spray on your face. This is a good way to inhale warm water vapor and get heat on your face at the same time. Cover your mouth and nose with your hands so you can still breathe as you do this.  · Use a cool mist vaporizer at night. Suck on peppermint, menthol, or eucalyptus hard candies during the day.  · An expectorant containing guaifenesin helps thin the mucus. It also helps your sinuses drain.  · You may use over-the-counter decongestants unless a similar medicine was prescribed. Nasal sprays or drops work the fastest. Use one that contains phenylephrine or oxymetazoline. First blow your nose gently to remove mucus. Then apply the spray or drops. Don't use decongestant nasal sprays or drops more often than the label says or for more than 3 days. This can make symptoms worse. Nasal sprays or drops prescribed by your doctor typically do not have these limits. Check with your doctor or pharmacist. You may also use oral tablets containing pseudoephedrine. Side effects from oral decongestants tend to be worse than with nasal sprays or drops, and may keep you from using them. Many sinus remedies combine ingredients, which may increase side effects. Also, if you are taking a combination medicine with another medicine, be sure you are not taking a double dose of anything by mistake. Read the labels or ask the pharmacist for help. Talk with your doctor before using decongestants if you have high blood pressure, heart disease, glaucoma, or prostate trouble.  · Antihistamines may help if allergies are causing your sinusitis. You can get chlorpheniramine and diphenhydramine over the counter, but these can cause drowsiness. Don't use these if you have glaucoma or if you are a man with trouble urinating due to an enlarged prostate. Over-the-counter antihistamines containing loratidine and cetirizine cause less drowsiness and may be a better  choice for daytime use.  · When allergies cause your sinusitis, a saline nasal rinse may give relief. A saline nasal rinse reduces swelling and clears excess mucus. This allows sinuses to drain. Prepackaged kits are available at most drugstores. These contain premixed salt packets and an irrigation device. If antibiotics have been prescribed to treat an acute sinus infection, talk with your doctor before using a nasal rinse to be sure it is safe for you.  · You may use over-the-counter medicine to control pain and fever, unless another pain medicine was prescribed. Talk with your doctor before using acetaminophen or ibuprofen if you have chronic liver or kidney disease. Also talk with your doctor if you have ever had a stomach ulcer. Aspirin should never be used in anyone under 18 years of age who has a fever. It may cause a life-threatening condition called Reye syndrome.  · If antibiotics were given, finish all of them, even if you are feeling better after a few days.  Follow-up care  Follow up with your healthcare provider, or as advised if your symptoms aren't better in 1 week.  Call 911  Call 911 if any of these occur:  · Unusual drowsiness or confusion  · Swelling of the forehead or eyelids  · Vision problems including blurred or double vision  · Seizure  When to seek medical advice  Call your healthcare provider right away if any of these occur:  · Facial pain or headache becomes more severe  · Stiff neck  · Fever over 100.4º F (38.0º C) for more than 3 days on antibiotics  · Bleeding from the nose or throat  Date Last Reviewed: 10/1/2016  © 4554-0608 [employer]. [address] [place] [address], [place] [address]. All rights reserved. This information is not intended as a substitute for professional medical care. Always follow your healthcare professional's instructions.

## 2019-01-24 ENCOUNTER — TELEPHONE (OUTPATIENT)
Dept: OPTOMETRY | Facility: CLINIC | Age: 44
End: 2019-01-24

## 2019-01-24 NOTE — TELEPHONE ENCOUNTER
Rescheduled appointment for 02/22/2019 @2:30----- Message from Francia Manuel sent at 1/24/2019 10:23 AM CST -----  Contact: Onofre  Patient Requesting Sooner Appointment.     Reason for sooner appt.: pt stated he needed to come in on tomorrow or 2/1 if possible. Pt stated something came up for work so he won't be able to make this appt on 2/8    When is the first available appointment? 2/8    Communication Preference: [phone]    Additional Information:

## 2019-02-22 ENCOUNTER — OFFICE VISIT (OUTPATIENT)
Dept: OPTOMETRY | Facility: CLINIC | Age: 44
End: 2019-02-22
Payer: COMMERCIAL

## 2019-02-22 DIAGNOSIS — H18.603 KERATOCONUS OF BOTH EYES: Primary | ICD-10-CM

## 2019-02-22 PROCEDURE — 92499 UNLISTED OPH SVC/PROCEDURE: CPT | Mod: ,,, | Performed by: OPTOMETRIST

## 2019-02-22 PROCEDURE — 99499 UNLISTED E&M SERVICE: CPT | Mod: S$GLB,,, | Performed by: OPTOMETRIST

## 2019-02-22 PROCEDURE — 99499 NO LOS: ICD-10-PCS | Mod: S$GLB,,, | Performed by: OPTOMETRIST

## 2019-02-22 PROCEDURE — 92499 PR CONTACT LENS F/U LEV 1: ICD-10-PCS | Mod: ,,, | Performed by: OPTOMETRIST

## 2019-02-22 NOTE — PROGRESS NOTES
HPI     Patient is here for clfu and clpu     Last edited by Carlos A Edmonds PCT on 2/22/2019  2:35 PM. (History)            Assessment /Plan     For exam results, see Encounter Report.    Keratoconus of both eyes  -Pt reports vast improvement in right eye, much happier with fit  -Ok FTW    RTC annual

## 2019-03-21 DIAGNOSIS — E78.5 HYPERLIPIDEMIA, UNSPECIFIED HYPERLIPIDEMIA TYPE: ICD-10-CM

## 2019-03-21 NOTE — TELEPHONE ENCOUNTER
----- Message from Amira Stevens sent at 3/21/2019  4:01 PM CDT -----  Contact: [phone]/self  Patient requesting a refill for his cholesterol medication (name of medication unknown)

## 2019-03-21 NOTE — TELEPHONE ENCOUNTER
----- Message from Anders Brewer sent at 3/21/2019  4:48 PM CDT -----  Contact: self/[phone]  Type:  RX Refill Request    Who Called: patient  RX Name and Strength:atorvastatin (LIPITOR) 20 MG tablet  How is the patient currently taking it? (ex. 1XDay): Take 1 tablet (20 mg total) by mouth once daily. - Oral  Is this a 30 day or 90 day RX: 90  Preferred Pharmacy with phone number: [facility] [phone]  Local or Mail Order: Local  Ordering Provider: Yaquelin  Would the patient rather a call back or a response via [facility]? Call back  Best Call Back Number: [phone]  Additional Information: none

## 2019-03-24 RX ORDER — ATORVASTATIN CALCIUM 20 MG/1
20 TABLET, FILM COATED ORAL DAILY
Qty: 90 TABLET | Refills: 1 | Status: SHIPPED | OUTPATIENT
Start: 2019-03-24 | End: 2020-06-05

## 2019-04-11 ENCOUNTER — OFFICE VISIT (OUTPATIENT)
Dept: OPHTHALMOLOGY | Facility: CLINIC | Age: 44
End: 2019-04-11
Payer: COMMERCIAL

## 2019-04-11 DIAGNOSIS — H10.32 ACUTE BACTERIAL CONJUNCTIVITIS OF LEFT EYE: Primary | ICD-10-CM

## 2019-04-11 PROCEDURE — 99999 PR PBB SHADOW E&M-EST. PATIENT-LVL I: ICD-10-PCS | Mod: PBBFAC,,, | Performed by: OPTOMETRIST

## 2019-04-11 PROCEDURE — 99999 PR PBB SHADOW E&M-EST. PATIENT-LVL I: CPT | Mod: PBBFAC,,, | Performed by: OPTOMETRIST

## 2019-04-11 PROCEDURE — 92012 PR EYE EXAM, EST PATIENT,INTERMED: ICD-10-PCS | Mod: S$GLB,,, | Performed by: OPTOMETRIST

## 2019-04-11 PROCEDURE — 92012 INTRM OPH EXAM EST PATIENT: CPT | Mod: S$GLB,,, | Performed by: OPTOMETRIST

## 2019-04-11 RX ORDER — OLOPATADINE HYDROCHLORIDE 1 MG/ML
1 SOLUTION/ DROPS OPHTHALMIC 2 TIMES DAILY
Qty: 5 ML | Refills: 4 | Status: SHIPPED | OUTPATIENT
Start: 2019-04-11 | End: 2019-09-05 | Stop reason: SDUPTHER

## 2019-04-11 RX ORDER — MOXIFLOXACIN 5 MG/ML
1 SOLUTION/ DROPS OPHTHALMIC 3 TIMES DAILY
Qty: 3 ML | Refills: 0 | Status: SHIPPED | OUTPATIENT
Start: 2019-04-11 | End: 2019-04-18

## 2019-04-11 NOTE — PROGRESS NOTES
HPI     Eye Problem      Additional comments: Film over OS              Comments     NP to DNL  Last seen by Dr. Castrejon on 1/4/19  Pain Scale:  5  Onset:   This morning  OD, OS, OU:  OS   Discharge:   Yes  A.M. Matting:  No  Itch:   Yes  Redness:   No  Photophobia:   No  Foreign body sensation:   No  Deep pain:   No  Previous occurrence:   Yes about 2 weeks ago  Drops:   No  Patient states he has CTL in when he woke up this morning he put CTL in.   Patient states CTL felt uncomfortable like he has a film over his eye.   States he feels as if the CTL are squeezing his eyes. Denies any over   wear.           Last edited by Maryjane Boyce, PCT on 4/11/2019  4:21 PM. (History)              Assessment /Plan     For exam results, see Encounter Report.    Acute bacterial conjunctivitis of left eye    -     moxifloxacin (VIGAMOX) 0.5 % ophthalmic solution; Place 1 drop into the left eye 3 (three) times daily. for 7 days  Dispense: 3 mL; Refill: 0  -     olopatadine (PATANOL) 0.1 % ophthalmic solution; Place 1 drop into both eyes 2 (two) times daily.  Dispense: 5 mL; Refill: 4    D/c cls wear OS   Start vigamox as directed  Pt reports itchy eyes bilaterally after removing lenses, rx given for olopatadine    RTC ASAP with any worsening or if not resolved x 1 week  Recommended pt f/u with Dr Castrejon if cls still uncomfortable once conjunctivitis resolves  Discussed above and all questions were answered.

## 2019-07-24 ENCOUNTER — TELEPHONE (OUTPATIENT)
Dept: OPTOMETRY | Facility: CLINIC | Age: 44
End: 2019-07-24

## 2019-07-24 NOTE — TELEPHONE ENCOUNTER
----- Message from Joslyn Platt sent at 7/24/2019  2:56 PM CDT -----  Contact: Onofre  Pt calling to inform that when he removes his scleral lens from his OS that the lens is cloudy. He would like to know if there is any other solution he can use to clear it up.  He can be reached at [phone]

## 2019-08-15 ENCOUNTER — TELEPHONE (OUTPATIENT)
Dept: OPTOMETRY | Facility: CLINIC | Age: 44
End: 2019-08-15

## 2019-08-15 NOTE — TELEPHONE ENCOUNTER
----- Message from Dunia Pierce sent at 8/15/2019 11:46 AM CDT -----  Contact: benigno Sims   Pt would like to speak with  nurse about the contact lens,pt can be reached at [phone] please thank you.

## 2019-09-05 ENCOUNTER — OFFICE VISIT (OUTPATIENT)
Dept: OPTOMETRY | Facility: CLINIC | Age: 44
End: 2019-09-05
Payer: COMMERCIAL

## 2019-09-05 DIAGNOSIS — H52.213 IRREGULAR ASTIGMATISM OF BOTH EYES: ICD-10-CM

## 2019-09-05 DIAGNOSIS — H10.13 ALLERGIC CONJUNCTIVITIS, BILATERAL: ICD-10-CM

## 2019-09-05 DIAGNOSIS — H18.603 KERATOCONUS OF BOTH EYES: Primary | ICD-10-CM

## 2019-09-05 PROCEDURE — 92310 CONTACT LENS FITTING OU: CPT | Mod: CSM,,, | Performed by: OPTOMETRIST

## 2019-09-05 PROCEDURE — 92310 PR CONTACT LENS FITTING (NO CHANGE): ICD-10-PCS | Mod: CSM,,, | Performed by: OPTOMETRIST

## 2019-09-05 PROCEDURE — 92014 COMPRE OPH EXAM EST PT 1/>: CPT | Mod: S$GLB,,, | Performed by: OPTOMETRIST

## 2019-09-05 PROCEDURE — 92014 PR EYE EXAM, EST PATIENT,COMPREHESV: ICD-10-PCS | Mod: S$GLB,,, | Performed by: OPTOMETRIST

## 2019-09-05 RX ORDER — OLOPATADINE HYDROCHLORIDE 1 MG/ML
1 SOLUTION/ DROPS OPHTHALMIC 2 TIMES DAILY
Qty: 5 ML | Refills: 4 | Status: SHIPPED | OUTPATIENT
Start: 2019-09-05 | End: 2020-08-24

## 2019-09-24 ENCOUNTER — TELEPHONE (OUTPATIENT)
Dept: OPTOMETRY | Facility: CLINIC | Age: 44
End: 2019-09-24

## 2019-09-24 NOTE — TELEPHONE ENCOUNTER
----- Message from Sundar Chicas sent at 9/23/2019  2:40 PM CDT -----  [facility]   Patient's contacts are ready for pickup but we are waiting on insurance to pay

## 2019-10-25 ENCOUNTER — OFFICE VISIT (OUTPATIENT)
Dept: OPTOMETRY | Facility: CLINIC | Age: 44
End: 2019-10-25
Payer: COMMERCIAL

## 2019-10-25 DIAGNOSIS — H18.603 KERATOCONUS OF BOTH EYES: ICD-10-CM

## 2019-10-25 PROCEDURE — 99499 NO LOS: ICD-10-PCS | Mod: S$GLB,,, | Performed by: OPTOMETRIST

## 2019-10-25 PROCEDURE — 92499 UNLISTED OPH SVC/PROCEDURE: CPT | Mod: ,,, | Performed by: OPTOMETRIST

## 2019-10-25 PROCEDURE — 99499 UNLISTED E&M SERVICE: CPT | Mod: S$GLB,,, | Performed by: OPTOMETRIST

## 2019-10-25 PROCEDURE — 92499 PR CONTACT LENS F/U LEV 1: ICD-10-PCS | Mod: ,,, | Performed by: OPTOMETRIST

## 2019-11-12 ENCOUNTER — TELEPHONE (OUTPATIENT)
Dept: OPTOMETRY | Facility: CLINIC | Age: 44
End: 2019-11-12

## 2020-08-07 ENCOUNTER — TELEPHONE (OUTPATIENT)
Dept: FAMILY MEDICINE | Facility: CLINIC | Age: 45
End: 2020-08-07

## 2020-08-07 NOTE — TELEPHONE ENCOUNTER
----- Message from Aparna Khan sent at 8/7/2020  9:16 AM CDT -----   - [facility] lab results 8/6/2020

## 2020-10-05 ENCOUNTER — PATIENT MESSAGE (OUTPATIENT)
Dept: INTERNAL MEDICINE | Facility: CLINIC | Age: 45
End: 2020-10-05

## 2020-12-02 ENCOUNTER — PATIENT MESSAGE (OUTPATIENT)
Dept: FAMILY MEDICINE | Facility: CLINIC | Age: 45
End: 2020-12-02

## 2020-12-16 ENCOUNTER — TELEPHONE (OUTPATIENT)
Dept: PLASTIC SURGERY | Facility: CLINIC | Age: 45
End: 2020-12-16

## 2020-12-16 NOTE — TELEPHONE ENCOUNTER
Left message for pt to call me back ,Also left my direct office number.             ----- Message from Eleonora Gilliland sent at 12/16/2020  1:08 PM CST -----  Regarding: appt  Contact: pt @ [phone]  Pt calling to speak with someone in Dr. Benson's office regarding his appt. Please call.

## 2021-01-05 ENCOUNTER — PATIENT MESSAGE (OUTPATIENT)
Dept: ADMINISTRATIVE | Facility: HOSPITAL | Age: 46
End: 2021-01-05

## 2021-02-04 ENCOUNTER — OFFICE VISIT (OUTPATIENT)
Dept: OTOLARYNGOLOGY | Facility: CLINIC | Age: 46
End: 2021-02-04
Payer: COMMERCIAL

## 2021-02-04 DIAGNOSIS — D17.0 LIPOMA OF FACE: Primary | ICD-10-CM

## 2021-02-04 DIAGNOSIS — L91.0 HYPERTROPHIC SCAR: ICD-10-CM

## 2021-02-04 PROCEDURE — 99204 PR OFFICE/OUTPT VISIT, NEW, LEVL IV, 45-59 MIN: ICD-10-PCS | Mod: S$GLB,,, | Performed by: OTOLARYNGOLOGY

## 2021-02-04 PROCEDURE — 99204 OFFICE O/P NEW MOD 45 MIN: CPT | Mod: S$GLB,,, | Performed by: OTOLARYNGOLOGY

## 2021-04-05 ENCOUNTER — PATIENT MESSAGE (OUTPATIENT)
Dept: ADMINISTRATIVE | Facility: HOSPITAL | Age: 46
End: 2021-04-05

## 2021-09-10 NOTE — PROGRESS NOTES
HPI     Onofre Sims is a/an 44 y.o. male who returns to  his new contact   lenses.        Last edited by DWAYNE Ivan on 10/25/2019  2:50 PM. (History)            Assessment /Plan     For exam results, see Encounter Report.    Keratoconus of both eyes  Contact Lens Final Rx     Final Contact Lens Rx       Brand Base Curve Diameter Sphere Cylinder Lens Addl. Specs    Right           Left Synergeyes VS 8.4 16.0 -5.00 Sphere 3800 36-42 Hydra-peg Optimum Comfort    Comments:  Warranty exchange    Ok to dispense                  RTC 1 yr                 
Class II - visualization of the soft palate, fauces, and uvula

## 2021-12-23 ENCOUNTER — OFFICE VISIT (OUTPATIENT)
Dept: FAMILY MEDICINE | Facility: CLINIC | Age: 46
End: 2021-12-23
Payer: COMMERCIAL

## 2021-12-23 VITALS
WEIGHT: 194.44 LBS | HEIGHT: 71 IN | DIASTOLIC BLOOD PRESSURE: 88 MMHG | HEART RATE: 89 BPM | BODY MASS INDEX: 27.22 KG/M2 | TEMPERATURE: 99 F | OXYGEN SATURATION: 97 % | SYSTOLIC BLOOD PRESSURE: 124 MMHG

## 2021-12-23 DIAGNOSIS — Z12.11 COLON CANCER SCREENING: ICD-10-CM

## 2021-12-23 DIAGNOSIS — Z00.00 WELLNESS EXAMINATION: Primary | ICD-10-CM

## 2021-12-23 DIAGNOSIS — E78.5 HYPERLIPIDEMIA, UNSPECIFIED HYPERLIPIDEMIA TYPE: ICD-10-CM

## 2021-12-23 DIAGNOSIS — N41.1 CHRONIC PROSTATITIS: ICD-10-CM

## 2021-12-23 DIAGNOSIS — H61.23 BILATERAL IMPACTED CERUMEN: ICD-10-CM

## 2021-12-23 PROCEDURE — 69209 REMOVE IMPACTED EAR WAX UNI: CPT | Mod: 50,S$GLB,, | Performed by: FAMILY MEDICINE

## 2021-12-23 PROCEDURE — 3079F PR MOST RECENT DIASTOLIC BLOOD PRESSURE 80-89 MM HG: ICD-10-PCS | Mod: CPTII,S$GLB,, | Performed by: FAMILY MEDICINE

## 2021-12-23 PROCEDURE — 3074F SYST BP LT 130 MM HG: CPT | Mod: CPTII,S$GLB,, | Performed by: FAMILY MEDICINE

## 2021-12-23 PROCEDURE — 99396 PR PREVENTIVE VISIT,EST,40-64: ICD-10-PCS | Mod: 25,S$GLB,, | Performed by: FAMILY MEDICINE

## 2021-12-23 PROCEDURE — 69209 EAR CERUMEN REMOVAL: ICD-10-PCS | Mod: 50,S$GLB,, | Performed by: FAMILY MEDICINE

## 2021-12-23 PROCEDURE — 3008F BODY MASS INDEX DOCD: CPT | Mod: CPTII,S$GLB,, | Performed by: FAMILY MEDICINE

## 2021-12-23 PROCEDURE — 3074F PR MOST RECENT SYSTOLIC BLOOD PRESSURE < 130 MM HG: ICD-10-PCS | Mod: CPTII,S$GLB,, | Performed by: FAMILY MEDICINE

## 2021-12-23 PROCEDURE — 99396 PREV VISIT EST AGE 40-64: CPT | Mod: 25,S$GLB,, | Performed by: FAMILY MEDICINE

## 2021-12-23 PROCEDURE — 1159F MED LIST DOCD IN RCRD: CPT | Mod: CPTII,S$GLB,, | Performed by: FAMILY MEDICINE

## 2021-12-23 PROCEDURE — 3079F DIAST BP 80-89 MM HG: CPT | Mod: CPTII,S$GLB,, | Performed by: FAMILY MEDICINE

## 2021-12-23 PROCEDURE — 1159F PR MEDICATION LIST DOCUMENTED IN MEDICAL RECORD: ICD-10-PCS | Mod: CPTII,S$GLB,, | Performed by: FAMILY MEDICINE

## 2021-12-23 PROCEDURE — 3008F PR BODY MASS INDEX (BMI) DOCUMENTED: ICD-10-PCS | Mod: CPTII,S$GLB,, | Performed by: FAMILY MEDICINE

## 2021-12-29 ENCOUNTER — LAB VISIT (OUTPATIENT)
Dept: LAB | Facility: HOSPITAL | Age: 46
End: 2021-12-29
Attending: FAMILY MEDICINE
Payer: COMMERCIAL

## 2021-12-29 DIAGNOSIS — N41.1 CHRONIC PROSTATITIS: ICD-10-CM

## 2021-12-29 DIAGNOSIS — Z00.00 WELLNESS EXAMINATION: ICD-10-CM

## 2021-12-29 LAB
ALBUMIN SERPL BCP-MCNC: 4.6 G/DL (ref 3.5–5.2)
ALP SERPL-CCNC: 74 U/L (ref 38–126)
ALT SERPL W/O P-5'-P-CCNC: 20 U/L (ref 10–44)
ANION GAP SERPL CALC-SCNC: 10 MMOL/L (ref 8–16)
AST SERPL-CCNC: 29 U/L (ref 15–46)
BASOPHILS # BLD AUTO: 0.04 K/UL (ref 0–0.2)
BASOPHILS NFR BLD: 0.8 % (ref 0–1.9)
BILIRUB SERPL-MCNC: 1.5 MG/DL (ref 0.1–1)
CALCIUM SERPL-MCNC: 9.3 MG/DL (ref 8.7–10.5)
CHLORIDE SERPL-SCNC: 102 MMOL/L (ref 95–110)
CHOLEST SERPL-MCNC: 253 MG/DL (ref 120–199)
CHOLEST/HDLC SERPL: 5.9 {RATIO} (ref 2–5)
CO2 SERPL-SCNC: 29 MMOL/L (ref 23–29)
COMPLEXED PSA SERPL-MCNC: 0.29 NG/ML (ref 0–4)
CREAT SERPL-MCNC: 1.17 MG/DL (ref 0.5–1.4)
DIFFERENTIAL METHOD: ABNORMAL
EOSINOPHIL # BLD AUTO: 0.1 K/UL (ref 0–0.5)
EOSINOPHIL NFR BLD: 1.8 % (ref 0–8)
ERYTHROCYTE [DISTWIDTH] IN BLOOD BY AUTOMATED COUNT: 11.4 % (ref 11.5–14.5)
EST. GFR  (AFRICAN AMERICAN): >60 ML/MIN/1.73 M^2
EST. GFR  (NON AFRICAN AMERICAN): >60 ML/MIN/1.73 M^2
GLUCOSE SERPL-MCNC: 103 MG/DL (ref 70–110)
HCT VFR BLD AUTO: 40 % (ref 40–54)
HDLC SERPL-MCNC: 43 MG/DL (ref 40–75)
HDLC SERPL: 17 % (ref 20–50)
HGB BLD-MCNC: 13.2 G/DL (ref 14–18)
IMM GRANULOCYTES # BLD AUTO: 0.01 K/UL (ref 0–0.04)
IMM GRANULOCYTES NFR BLD AUTO: 0.2 % (ref 0–0.5)
LDLC SERPL CALC-MCNC: 187.6 MG/DL (ref 63–159)
LYMPHOCYTES # BLD AUTO: 0.8 K/UL (ref 1–4.8)
LYMPHOCYTES NFR BLD: 15.5 % (ref 18–48)
MCH RBC QN AUTO: 33.6 PG (ref 27–31)
MCHC RBC AUTO-ENTMCNC: 33 G/DL (ref 32–36)
MCV RBC AUTO: 102 FL (ref 82–98)
MONOCYTES # BLD AUTO: 0.4 K/UL (ref 0.3–1)
MONOCYTES NFR BLD: 7.1 % (ref 4–15)
NEUTROPHILS # BLD AUTO: 3.8 K/UL (ref 1.8–7.7)
NEUTROPHILS NFR BLD: 74.6 % (ref 38–73)
NONHDLC SERPL-MCNC: 210 MG/DL
NRBC BLD-RTO: 0 /100 WBC
PLATELET # BLD AUTO: 253 K/UL (ref 150–450)
PMV BLD AUTO: 10.1 FL (ref 9.2–12.9)
POTASSIUM SERPL-SCNC: 4.3 MMOL/L (ref 3.5–5.1)
PROT SERPL-MCNC: 8.7 G/DL (ref 6–8.4)
RBC # BLD AUTO: 3.93 M/UL (ref 4.6–6.2)
SODIUM SERPL-SCNC: 141 MMOL/L (ref 136–145)
TRIGL SERPL-MCNC: 112 MG/DL (ref 30–150)
TSH SERPL DL<=0.005 MIU/L-ACNC: 2.84 UIU/ML (ref 0.4–4)
UUN UR-MCNC: 15 MG/DL (ref 2–20)
WBC # BLD AUTO: 5.1 K/UL (ref 3.9–12.7)

## 2021-12-29 PROCEDURE — 85025 COMPLETE CBC W/AUTO DIFF WBC: CPT | Mod: PO | Performed by: FAMILY MEDICINE

## 2021-12-29 PROCEDURE — 84443 ASSAY THYROID STIM HORMONE: CPT | Performed by: FAMILY MEDICINE

## 2021-12-29 PROCEDURE — 86803 HEPATITIS C AB TEST: CPT | Mod: PO | Performed by: FAMILY MEDICINE

## 2021-12-29 PROCEDURE — 87389 HIV-1 AG W/HIV-1&-2 AB AG IA: CPT | Mod: PO | Performed by: FAMILY MEDICINE

## 2021-12-29 PROCEDURE — 36415 COLL VENOUS BLD VENIPUNCTURE: CPT | Mod: PO | Performed by: FAMILY MEDICINE

## 2021-12-29 PROCEDURE — 80053 COMPREHEN METABOLIC PANEL: CPT | Mod: PO | Performed by: FAMILY MEDICINE

## 2021-12-29 PROCEDURE — 80061 LIPID PANEL: CPT | Performed by: FAMILY MEDICINE

## 2021-12-29 PROCEDURE — 84153 ASSAY OF PSA TOTAL: CPT | Performed by: FAMILY MEDICINE

## 2021-12-30 LAB
HCV AB SERPL QL IA: NEGATIVE
HIV 1+2 AB+HIV1 P24 AG SERPL QL IA: NEGATIVE

## 2022-01-04 ENCOUNTER — TELEPHONE (OUTPATIENT)
Dept: FAMILY MEDICINE | Facility: CLINIC | Age: 47
End: 2022-01-04

## 2022-01-04 NOTE — TELEPHONE ENCOUNTER
----- Message from Thoedore Jamison MD sent at 1/2/2022  8:22 AM CST -----  Cholesterol still high; have you been taking the atorvastatin?

## 2022-01-04 NOTE — TELEPHONE ENCOUNTER
Pt stated that he had not been taking Atorvastatin, but he has resumed taking it after seeing lab results on portal.

## 2022-01-21 ENCOUNTER — PATIENT MESSAGE (OUTPATIENT)
Dept: FAMILY MEDICINE | Facility: CLINIC | Age: 47
End: 2022-01-21
Payer: COMMERCIAL

## 2022-01-21 ENCOUNTER — TELEPHONE (OUTPATIENT)
Dept: ENDOSCOPY | Facility: HOSPITAL | Age: 47
End: 2022-01-21
Payer: COMMERCIAL

## 2022-01-21 DIAGNOSIS — Z01.818 PRE-OP TESTING: ICD-10-CM

## 2022-01-21 NOTE — TELEPHONE ENCOUNTER
Endoscopy Scheduling Questionnaire:         1. Are you taking any blood thinners? No               If Yes  Have you been on them for longer than one year?  N/A    2. Have you been diagnosed with Diverticulitis in past three months?  No    3. Are you on dialysis or have Kidney Disease? No    4. Previous Colonoscopy?  No         If yes Do you have a history of colon polyps?  N/A      6. Are you a diabetic?  No    7. Do you have a history of constipation?  No      Procedure scheduled with Dr. Alonso  on  03/16/2022    The prep being used is  Golytely    The patient's prep instructions were sent by [facility]

## 2022-03-11 ENCOUNTER — TELEPHONE (OUTPATIENT)
Dept: ENDOSCOPY | Facility: HOSPITAL | Age: 47
End: 2022-03-11
Payer: COMMERCIAL

## 2022-03-11 NOTE — TELEPHONE ENCOUNTER
Spoke to Onofre Sims appoint for 3/16/2022 canceled will reschedule in May when schedule is available

## 2022-03-17 ENCOUNTER — TELEPHONE (OUTPATIENT)
Dept: ENDOSCOPY | Facility: HOSPITAL | Age: 47
End: 2022-03-17
Payer: COMMERCIAL

## 2022-03-17 NOTE — TELEPHONE ENCOUNTER
Attempted to contact Onofre Sims  to reschedule procedure , no answer, left message with call back number

## 2022-03-30 ENCOUNTER — TELEPHONE (OUTPATIENT)
Dept: ENDOSCOPY | Facility: HOSPITAL | Age: 47
End: 2022-03-30
Payer: COMMERCIAL

## 2022-04-01 ENCOUNTER — TELEPHONE (OUTPATIENT)
Dept: ENDOSCOPY | Facility: HOSPITAL | Age: 47
End: 2022-04-01
Payer: COMMERCIAL

## 2022-04-01 NOTE — TELEPHONE ENCOUNTER
Attempted to contact Onofre Sims  to schedule procedure , no answer, left message with call back number

## 2022-04-08 ENCOUNTER — PATIENT MESSAGE (OUTPATIENT)
Dept: ENDOSCOPY | Facility: HOSPITAL | Age: 47
End: 2022-04-08
Payer: COMMERCIAL

## 2022-04-08 ENCOUNTER — TELEPHONE (OUTPATIENT)
Dept: ENDOSCOPY | Facility: HOSPITAL | Age: 47
End: 2022-04-08
Payer: COMMERCIAL

## 2022-04-21 ENCOUNTER — TELEPHONE (OUTPATIENT)
Dept: FAMILY MEDICINE | Facility: CLINIC | Age: 47
End: 2022-04-21
Payer: COMMERCIAL

## 2022-04-21 NOTE — TELEPHONE ENCOUNTER
----- Message from Divine Turner sent at 4/21/2022  2:33 PM CDT -----  Type:  Patient Returning Call    Who Called: Pt   Who Left Message for Patient: Nelly   Does the patient know what this is regarding?: yes  Would the patient rather a call back or a response via [facility]? Call back   Best Call Back Number: [phone]  Additional Information:

## 2022-04-21 NOTE — TELEPHONE ENCOUNTER
----- Message from Mariaelena Sotelo sent at 4/21/2022  2:05 PM CDT -----  Contact: [phone]/Self  Type: Requesting to speak with nurse    Who Called: PT  Regarding: ask if Dr. Jamison can give referral for Colonoscopy out of state  Would the patient rather a call back or a response via [facility]? Call back  Best Call Back Number: [phone]  Additional Information: [facility] in [place], [place]

## 2022-04-22 ENCOUNTER — TELEPHONE (OUTPATIENT)
Dept: ENDOSCOPY | Facility: HOSPITAL | Age: 47
End: 2022-04-22
Payer: COMMERCIAL

## 2022-04-27 ENCOUNTER — TELEPHONE (OUTPATIENT)
Dept: FAMILY MEDICINE | Facility: CLINIC | Age: 47
End: 2022-04-27
Payer: COMMERCIAL

## 2022-04-27 NOTE — TELEPHONE ENCOUNTER
Spoke to patient. Patient asked if we could send the referral for a colonoscopy to a different state. I explained that the only way that you could have an order for out of state is if a provider in that did the order. Patient verbally understood.

## 2022-04-27 NOTE — TELEPHONE ENCOUNTER
----- Message from Divine Turner sent at 4/27/2022 12:39 PM CDT -----  Type:  Patient Returning Call    Who Called: PT   Who Left Message for Patient: Nelly /Alaina   Does the patient know what this is regarding?: orders for a colonoscopy   Would the patient rather a call back or a response via [facility]? Call back   Best Call Back Number: [phone]  Additional Information:

## 2022-05-04 ENCOUNTER — TELEPHONE (OUTPATIENT)
Dept: GASTROENTEROLOGY | Facility: CLINIC | Age: 47
End: 2022-05-04
Payer: COMMERCIAL

## 2022-05-04 ENCOUNTER — TELEPHONE (OUTPATIENT)
Dept: FAMILY MEDICINE | Facility: CLINIC | Age: 47
End: 2022-05-04
Payer: COMMERCIAL

## 2022-05-04 NOTE — TELEPHONE ENCOUNTER
----- Message from Loly Rausch sent at 5/4/2022 10:58 AM CDT -----  Type: Requesting to speak with nurse         Who Called: PT  Regarding: Pt wanting to get her procedure done out of state- needs a referral sent in  Would the patient rather a call back or a response via [facility]? Call back  Best Call Back Number: [phone]  Additional Information: [facility] in [place], [place] - [phone] fax # [phone]

## 2022-05-04 NOTE — TELEPHONE ENCOUNTER
Spoke with pt he stated that he found doctor in [place] to place referral for him but they need his last office note from  faxed over last office note to [phone]

## 2022-05-04 NOTE — TELEPHONE ENCOUNTER
----- Message from Loly Rausch sent at 5/4/2022 10:58 AM CDT -----  Type: Requesting to speak with nurse         Who Called: PT  Regarding: Pt wanting to get her procedure done out of state- needs a referral sent in  Would the patient rather a call back or a response via [facility]? Call back  Best Call Back Number: [phone]  Additional Information: [facility] in [place], [place] - [phone] fax # [phone]

## 2022-05-06 ENCOUNTER — TELEPHONE (OUTPATIENT)
Dept: GASTROENTEROLOGY | Facility: CLINIC | Age: 47
End: 2022-05-06
Payer: COMMERCIAL

## 2022-05-06 ENCOUNTER — TELEPHONE (OUTPATIENT)
Dept: ENDOSCOPY | Facility: HOSPITAL | Age: 47
End: 2022-05-06
Payer: COMMERCIAL

## 2022-05-06 NOTE — TELEPHONE ENCOUNTER
----- Message from Evelina Wells sent at 5/6/2022  2:30 PM CDT -----  Regarding: Call back  Contact: [phone]  Who Called: PT     Patient is calling to talk to nurse in regards to rescheduling his procedure. Please advice

## 2022-05-16 ENCOUNTER — TELEPHONE (OUTPATIENT)
Dept: FAMILY MEDICINE | Facility: CLINIC | Age: 47
End: 2022-05-16
Payer: COMMERCIAL

## 2022-05-16 NOTE — TELEPHONE ENCOUNTER
Attempted to call pt.  No answer.  I left a message.     ----- Message from Mariaelena Sotelo sent at 5/16/2022  2:38 PM CDT -----  Contact: [phone]/ Self  Type: Requesting to speak with nurse    Who Called: Pt   Regarding: pt is requesting a referral to have a colonoscopy done in [place]    Would the patient rather a call back or a response via [facility]? Call back  Best Call Back Number: [phone]  Additional Information: [phone] in [place], [place]

## 2022-05-17 NOTE — TELEPHONE ENCOUNTER
Beverly Jaimes Staff  Caller: Unspecified (Today, 11:09 AM)  Needs advice from nurse:       Who Called:pt   Regarding:returning a call   Would the patient rather a call back or VIA [facility]?   Best Call Back number:[phone]   Additional Info:       I will discuss with Leah

## 2022-05-19 ENCOUNTER — TELEPHONE (OUTPATIENT)
Dept: GASTROENTEROLOGY | Facility: CLINIC | Age: 47
End: 2022-05-19
Payer: COMMERCIAL

## 2022-05-19 NOTE — TELEPHONE ENCOUNTER
----- Message from Amish Fisher sent at 5/19/2022  9:20 AM CDT -----  Type:  Patient Returning Call    Who Called:pt  Would the patient rather a call back or a response via [facility]? call  Best Call Back Number:[phone]  Additional Information:     Pt would like to cancel procedure on 05/26  Pt prefers end of july

## 2022-05-19 NOTE — TELEPHONE ENCOUNTER
Spoke to patient, I rescheduled patients Colonoscopy procedure to 08/03/2022. Verbal understanding.

## 2022-05-20 ENCOUNTER — TELEPHONE (OUTPATIENT)
Dept: FAMILY MEDICINE | Facility: CLINIC | Age: 47
End: 2022-05-20
Payer: COMMERCIAL

## 2022-05-20 DIAGNOSIS — Z12.11 COLON CANCER SCREENING: Primary | ICD-10-CM

## 2022-05-20 NOTE — TELEPHONE ENCOUNTER
Spoke to patient. Patient found a GI clinic in [place] that is willing to take an out of state order for the colonoscopy. Need new colonoscopy order to be sent to the external GI clinic in [place].      Fax number for order: [phone]  [facility]

## 2022-05-20 NOTE — TELEPHONE ENCOUNTER
----- Message from Beverly Ruiz sent at 5/20/2022 11:35 AM CDT -----  Needs advice from nurse:      Who Called:pt  Regarding:returning a call  Would the patient rather a call back or [facility]IA [facility]?  Best Call Back number:[phone]  Additional Info:

## 2022-06-25 NOTE — PROGRESS NOTES
HPI     Patient is here for clfu and annual .  Pt sts Cloudy vision OS when wearing the lens after a few hours, pt sts   when removing the contact lens the solution is not clear.  Pt sts he had deposit build up on lens.    Pt uses unique PH to clean the lens and clear care.    Stopped Patanol 9 months ago    Last edited by Jarrett Castrejon, OD on 9/5/2019  2:00 PM. (History)            Assessment /Plan     For exam results, see Encounter Report.    Keratoconus of both eyes  Irregular astigmatism of both eyes  Eyeglass Final Rx     Eyeglass Final Rx       Sphere Cylinder Axis    Right -16.75 +2.50 153    Left -14.25 +4.50 025    Type:  No Benefit              Contact Lens Final Rx     Final Contact Lens Rx       Brand Base Curve Diameter Sphere Cylinder Axis Lens Addl. Specs    Right Synergeyes VS 8.4 16.0 +0.50 -1.25 120 3600 36-42 Hydra-peg Optimum Comfort    Left Synergeyes VS 8.4 16.0 -5.00 Sphere 120 3500 36-42 Hydra-peg Optimum Comfort    Comments:  Order with warranty    Pt requests submitting to [facility]    This is not a final prescription.  This is specialty order contact lens that requires follow up care and warranty adjustment.                     Allergic conjunctivitis, bilateral  -     olopatadine (PATANOL) 0.1 % ophthalmic solution; Place 1 drop into both eyes 2 (two) times daily.  Dispense: 5 mL; Refill: 4      RTC dispense                 
+BL pulm edema and infiltrates

## 2022-11-04 ENCOUNTER — PATIENT OUTREACH (OUTPATIENT)
Dept: ADMINISTRATIVE | Facility: HOSPITAL | Age: 47
End: 2022-11-04
Payer: COMMERCIAL

## 2022-11-04 ENCOUNTER — PATIENT MESSAGE (OUTPATIENT)
Dept: ADMINISTRATIVE | Facility: HOSPITAL | Age: 47
End: 2022-11-04
Payer: COMMERCIAL

## 2022-11-04 NOTE — PROGRESS NOTES
Care Everywhere updates requested and reviewed.  Immunizations reconciled. Media reports reviewed.  Duplicate HM overrides and  orders removed.  Overdue HM topic chart audit and/or requested.  Overdue lab testing linked to upcoming lab appointments if applies.    Health Maintenance Due   Topic Date Due    Colorectal Cancer Screening  Never done    COVID-19 Vaccine (4 - Booster for Pfizer series) 2022    Influenza Vaccine (1) 2022

## 2022-11-18 ENCOUNTER — OFFICE VISIT (OUTPATIENT)
Dept: FAMILY MEDICINE | Facility: CLINIC | Age: 47
End: 2022-11-18
Payer: COMMERCIAL

## 2022-11-18 VITALS
TEMPERATURE: 99 F | SYSTOLIC BLOOD PRESSURE: 124 MMHG | OXYGEN SATURATION: 96 % | BODY MASS INDEX: 26.57 KG/M2 | DIASTOLIC BLOOD PRESSURE: 70 MMHG | HEIGHT: 71 IN | HEART RATE: 73 BPM | WEIGHT: 189.81 LBS

## 2022-11-18 DIAGNOSIS — H18.603 KERATOCONUS OF BOTH EYES: ICD-10-CM

## 2022-11-18 DIAGNOSIS — E78.5 HYPERLIPIDEMIA, UNSPECIFIED HYPERLIPIDEMIA TYPE: ICD-10-CM

## 2022-11-18 DIAGNOSIS — R79.89 ELEVATED LFTS: ICD-10-CM

## 2022-11-18 DIAGNOSIS — Z12.11 COLON CANCER SCREENING: ICD-10-CM

## 2022-11-18 DIAGNOSIS — H61.21 EXCESSIVE EAR WAX, RIGHT: ICD-10-CM

## 2022-11-18 DIAGNOSIS — Z00.00 WELLNESS EXAMINATION: Primary | ICD-10-CM

## 2022-11-18 LAB
AMORPH CRY UR QL COMP ASSIST: NORMAL
BILIRUB UR QL STRIP: NEGATIVE
CLARITY UR REFRACT.AUTO: ABNORMAL
COLOR UR AUTO: YELLOW
GLUCOSE UR QL STRIP: NEGATIVE
HGB UR QL STRIP: NEGATIVE
KETONES UR QL STRIP: NEGATIVE
LEUKOCYTE ESTERASE UR QL STRIP: NEGATIVE
MICROSCOPIC COMMENT: NORMAL
NITRITE UR QL STRIP: NEGATIVE
PH UR STRIP: 5 [PH] (ref 5–8)
PROT UR QL STRIP: NEGATIVE
SP GR UR STRIP: 1.02 (ref 1–1.03)
URN SPEC COLLECT METH UR: ABNORMAL

## 2022-11-18 PROCEDURE — 3078F DIAST BP <80 MM HG: CPT | Mod: CPTII,S$GLB,, | Performed by: FAMILY MEDICINE

## 2022-11-18 PROCEDURE — 69209 REMOVE IMPACTED EAR WAX UNI: CPT | Mod: RT,S$GLB,, | Performed by: FAMILY MEDICINE

## 2022-11-18 PROCEDURE — 81001 URINALYSIS AUTO W/SCOPE: CPT | Performed by: FAMILY MEDICINE

## 2022-11-18 PROCEDURE — 3074F SYST BP LT 130 MM HG: CPT | Mod: CPTII,S$GLB,, | Performed by: FAMILY MEDICINE

## 2022-11-18 PROCEDURE — 69209 EAR CERUMEN REMOVAL: ICD-10-PCS | Mod: RT,S$GLB,, | Performed by: FAMILY MEDICINE

## 2022-11-18 PROCEDURE — 3008F PR BODY MASS INDEX (BMI) DOCUMENTED: ICD-10-PCS | Mod: CPTII,S$GLB,, | Performed by: FAMILY MEDICINE

## 2022-11-18 PROCEDURE — 3074F PR MOST RECENT SYSTOLIC BLOOD PRESSURE < 130 MM HG: ICD-10-PCS | Mod: CPTII,S$GLB,, | Performed by: FAMILY MEDICINE

## 2022-11-18 PROCEDURE — 1159F PR MEDICATION LIST DOCUMENTED IN MEDICAL RECORD: ICD-10-PCS | Mod: CPTII,S$GLB,, | Performed by: FAMILY MEDICINE

## 2022-11-18 PROCEDURE — 99396 PREV VISIT EST AGE 40-64: CPT | Mod: 25,S$GLB,, | Performed by: FAMILY MEDICINE

## 2022-11-18 PROCEDURE — 3008F BODY MASS INDEX DOCD: CPT | Mod: CPTII,S$GLB,, | Performed by: FAMILY MEDICINE

## 2022-11-18 PROCEDURE — 3078F PR MOST RECENT DIASTOLIC BLOOD PRESSURE < 80 MM HG: ICD-10-PCS | Mod: CPTII,S$GLB,, | Performed by: FAMILY MEDICINE

## 2022-11-18 PROCEDURE — 99396 PR PREVENTIVE VISIT,EST,40-64: ICD-10-PCS | Mod: 25,S$GLB,, | Performed by: FAMILY MEDICINE

## 2022-11-18 PROCEDURE — 1159F MED LIST DOCD IN RCRD: CPT | Mod: CPTII,S$GLB,, | Performed by: FAMILY MEDICINE

## 2022-11-18 NOTE — PROCEDURES
"Ear Cerumen Removal    Date/Time: 11/18/2022 11:33 AM  Performed by: Theodore Jamison MD  Authorized by: Theodore Jamison MD     Time out: Immediately prior to procedure a "time out" was called to verify the correct patient, procedure, equipment, support staff and site/side marked as required.    Consent Done?:  Yes (Verbal)    Local anesthetic:  None  Location details:  Right ear  Procedure type: irrigation    Cerumen  Removal Results:  Cerumen completely removed  Patient tolerance:  Patient tolerated the procedure well with no immediate complications  "

## 2022-11-18 NOTE — PROGRESS NOTES
"Subjective:      Patient ID: Onofre Sims is a 47 y.o. male.    Chief Complaint: No chief complaint on file.      Vitals:    11/18/22 1056   BP: 124/70   Pulse: 73   Temp: 98.5 °F (36.9 °C)   TempSrc: Oral   SpO2: 96%   Weight: 86.1 kg (189 lb 13.1 oz)   Height: 5' 10.5" (1.791 m)        HPI   Wellness; due colonoscopy  Ear stopped up; sl frwq with urine        Problem List  Patient Active Problem List   Diagnosis    Elevated LFTs    Hyperlipidemia    Lipoma of torso    Keratoconus of both eyes        ALLERGIES: Review of patient's allergies indicates:  No Known Allergies    MEDS:   Current Outpatient Medications:     atorvastatin (LIPITOR) 20 MG tablet, Take 1 tablet by mouth once daily, Disp: 90 tablet, Rfl: 3    olopatadine (PATANOL) 0.1 % ophthalmic solution, INSTILL 1 DROP INTO EACH EYE TWICE DAILY, Disp: 5 mL, Rfl: 0    fluticasone (FLONASE) 50 mcg/actuation nasal spray, 1 spray (50 mcg total) by Each Nare route once daily. (Patient not taking: Reported on 12/23/2021), Disp: 1 Bottle, Rfl: 0      History:  Current Providers as of 11/18/2022  PCP: Theodore Jamison MD  Care Team Provider: Fiordaliza Alvarado LPN  Care Team Provider: Venus Manning LPN  Care Team Provider: RADHA Butts Jr., OD  Care Team Provider: Orlando Hernandez MD  Encounter Provider: Theodore Jamison MD, starting on Fri Nov 18, 2022 12:00 AM  Referring Provider: not found, starting on Fri Nov 18, 2022 12:00 AM  Consulting Physician: Theodore Jamison MD, starting on Fri Nov 18, 2022 10:51 AM (Active)   Past Medical History:   Diagnosis Date    Anxiety     Depression     Hyperlipidemia     Keratoconus      Past Surgical History:   Procedure Laterality Date    BREAST SURGERY      Mc [place]aff, joana, not cancer     Social History     Tobacco Use    Smoking status: Never    Smokeless tobacco: Never   Substance Use Topics    Alcohol use: Yes     Comment: socially    Drug use: No         Review of Systems   Constitutional:  Negative for appetite " change, fatigue, fever and unexpected weight change.   HENT:  Positive for hearing loss. Negative for congestion, ear pain, sinus pressure and sore throat.    Eyes:  Negative for pain and visual disturbance.   Respiratory:  Negative for shortness of breath.    Cardiovascular:  Negative for chest pain.   Gastrointestinal:  Negative for abdominal pain, constipation and diarrhea.   Endocrine: Negative for polyuria.   Genitourinary:  Negative for difficulty urinating and frequency.   Musculoskeletal:  Negative for arthralgias, back pain and myalgias.   Skin:  Negative for color change.   Allergic/Immunologic: Negative.    Neurological:  Negative for syncope, weakness and headaches.   Hematological:  Does not bruise/bleed easily.   Psychiatric/Behavioral:  Negative for dysphoric mood and suicidal ideas. The patient is not nervous/anxious.    All other systems reviewed and are negative.  Objective:     Physical Exam  Vitals and nursing note reviewed.   Constitutional:       General: He is not in acute distress.     Appearance: He is well-developed. He is not diaphoretic.   HENT:      Head: Normocephalic and atraumatic.      Right Ear: Tympanic membrane, ear canal and external ear normal. There is impacted cerumen.      Left Ear: Tympanic membrane, ear canal and external ear normal. There is no impacted cerumen.      Mouth/Throat:      Pharynx: No oropharyngeal exudate.   Eyes:      General: No scleral icterus.        Right eye: No discharge.         Left eye: No discharge.      Conjunctiva/sclera: Conjunctivae normal.      Pupils: Pupils are equal, round, and reactive to light.   Neck:      Thyroid: No thyromegaly.      Vascular: No JVD.      Trachea: No tracheal deviation.   Cardiovascular:      Rate and Rhythm: Normal rate and regular rhythm.      Heart sounds: No murmur heard.    No friction rub. No gallop.   Pulmonary:      Effort: Pulmonary effort is normal. No respiratory distress.      Breath sounds: Normal breath  sounds. No stridor. No wheezing or rales.   Chest:      Chest wall: No tenderness.   Abdominal:      General: There is no distension.      Palpations: Abdomen is soft. There is no mass.      Tenderness: There is no abdominal tenderness. There is no guarding or rebound.   Genitourinary:     Prostate: Normal.      Rectum: Normal.   Musculoskeletal:         General: No tenderness. Normal range of motion.      Cervical back: Normal range of motion and neck supple.   Lymphadenopathy:      Cervical: No cervical adenopathy.   Skin:     General: Skin is warm and dry.      Coloration: Skin is not pale.      Findings: No erythema or rash.   Neurological:      Mental Status: He is alert and oriented to person, place, and time.      Cranial Nerves: No cranial nerve deficit.      Motor: No abnormal muscle tone.      Coordination: Coordination normal.      Deep Tendon Reflexes: Reflexes are normal and symmetric. Reflexes normal.   Psychiatric:         Behavior: Behavior normal.         Thought Content: Thought content normal.         Judgment: Judgment normal.           Assessment:     1. Colon cancer screening    2. Wellness examination    3. Hyperlipidemia, unspecified hyperlipidemia type    4. Keratoconus of both eyes    5. Elevated LFTs    6. Excessive ear wax, right      Plan:        Medication List            Accurate as of November 18, 2022 11:49 AM. If you have any questions, ask your nurse or doctor.                CONTINUE taking these medications      atorvastatin 20 MG tablet  Commonly known as: LIPITOR  Take 1 tablet by mouth once daily     fluticasone propionate 50 mcg/actuation nasal spray  Commonly known as: FLONASE  1 spray (50 mcg total) by Each Nare route once daily.     olopatadine 0.1 % ophthalmic solution  Commonly known as: PATANOL  INSTILL 1 DROP INTO EACH EYE TWICE DAILY            Colon cancer screening  -     Case Request Endoscopy: COLONOSCOPY  -     CBC Auto Differential; Future; Expected date:  11/18/2022  -     Comprehensive Metabolic Panel; Future; Expected date: 11/18/2022  -     Lipid Panel; Future  -     PSA, Screening; Future  -     TSH; Future  -     Urinalysis; Future    Wellness examination  -     CBC Auto Differential; Future; Expected date: 11/18/2022  -     Comprehensive Metabolic Panel; Future; Expected date: 11/18/2022  -     Lipid Panel; Future  -     PSA, Screening; Future  -     TSH; Future  -     Urinalysis; Future    Hyperlipidemia, unspecified hyperlipidemia type  -     CBC Auto Differential; Future; Expected date: 11/18/2022  -     Comprehensive Metabolic Panel; Future; Expected date: 11/18/2022  -     Lipid Panel; Future  -     PSA, Screening; Future  -     TSH; Future  -     Urinalysis; Future    Keratoconus of both eyes  -     CBC Auto Differential; Future; Expected date: 11/18/2022  -     Comprehensive Metabolic Panel; Future; Expected date: 11/18/2022  -     Lipid Panel; Future  -     PSA, Screening; Future  -     TSH; Future  -     Urinalysis; Future    Elevated LFTs  -     CBC Auto Differential; Future; Expected date: 11/18/2022  -     Comprehensive Metabolic Panel; Future; Expected date: 11/18/2022  -     Lipid Panel; Future  -     PSA, Screening; Future  -     TSH; Future  -     Urinalysis; Future    Excessive ear wax, right  -     Ear Cerumen Removal

## 2022-12-20 ENCOUNTER — LAB VISIT (OUTPATIENT)
Dept: LAB | Facility: HOSPITAL | Age: 47
End: 2022-12-20
Attending: FAMILY MEDICINE
Payer: COMMERCIAL

## 2022-12-20 DIAGNOSIS — R79.89 ELEVATED LFTS: ICD-10-CM

## 2022-12-20 DIAGNOSIS — E78.5 HYPERLIPIDEMIA, UNSPECIFIED HYPERLIPIDEMIA TYPE: ICD-10-CM

## 2022-12-20 DIAGNOSIS — H18.603 KERATOCONUS OF BOTH EYES: ICD-10-CM

## 2022-12-20 DIAGNOSIS — Z12.11 COLON CANCER SCREENING: ICD-10-CM

## 2022-12-20 DIAGNOSIS — Z00.00 WELLNESS EXAMINATION: ICD-10-CM

## 2022-12-20 LAB
ALBUMIN SERPL BCP-MCNC: 4.8 G/DL (ref 3.5–5.2)
ALP SERPL-CCNC: 76 U/L (ref 38–126)
ALT SERPL W/O P-5'-P-CCNC: 25 U/L (ref 10–44)
ANION GAP SERPL CALC-SCNC: 4 MMOL/L (ref 8–16)
AST SERPL-CCNC: 28 U/L (ref 15–46)
BASOPHILS # BLD AUTO: 0.04 K/UL (ref 0–0.2)
BASOPHILS NFR BLD: 0.8 % (ref 0–1.9)
BILIRUB SERPL-MCNC: 2 MG/DL (ref 0.1–1)
CALCIUM SERPL-MCNC: 9.4 MG/DL (ref 8.7–10.5)
CHLORIDE SERPL-SCNC: 105 MMOL/L (ref 95–110)
CHOLEST SERPL-MCNC: 218 MG/DL (ref 120–199)
CHOLEST/HDLC SERPL: 4.6 {RATIO} (ref 2–5)
CO2 SERPL-SCNC: 33 MMOL/L (ref 23–29)
COMPLEXED PSA SERPL-MCNC: 0.16 NG/ML (ref 0–4)
CREAT SERPL-MCNC: 1.29 MG/DL (ref 0.5–1.4)
DIFFERENTIAL METHOD: ABNORMAL
EOSINOPHIL # BLD AUTO: 0.1 K/UL (ref 0–0.5)
EOSINOPHIL NFR BLD: 2 % (ref 0–8)
ERYTHROCYTE [DISTWIDTH] IN BLOOD BY AUTOMATED COUNT: 11.2 % (ref 11.5–14.5)
EST. GFR  (NO RACE VARIABLE): >60 ML/MIN/1.73 M^2
GLUCOSE SERPL-MCNC: 106 MG/DL (ref 70–110)
HCT VFR BLD AUTO: 39.8 % (ref 40–54)
HDLC SERPL-MCNC: 47 MG/DL (ref 40–75)
HDLC SERPL: 21.6 % (ref 20–50)
HGB BLD-MCNC: 13.2 G/DL (ref 14–18)
IMM GRANULOCYTES # BLD AUTO: 0.01 K/UL (ref 0–0.04)
IMM GRANULOCYTES NFR BLD AUTO: 0.2 % (ref 0–0.5)
LDLC SERPL CALC-MCNC: 151.4 MG/DL (ref 63–159)
LYMPHOCYTES # BLD AUTO: 1.1 K/UL (ref 1–4.8)
LYMPHOCYTES NFR BLD: 22.2 % (ref 18–48)
MCH RBC QN AUTO: 33 PG (ref 27–31)
MCHC RBC AUTO-ENTMCNC: 33.2 G/DL (ref 32–36)
MCV RBC AUTO: 100 FL (ref 82–98)
MONOCYTES # BLD AUTO: 0.3 K/UL (ref 0.3–1)
MONOCYTES NFR BLD: 6.4 % (ref 4–15)
NEUTROPHILS # BLD AUTO: 3.4 K/UL (ref 1.8–7.7)
NEUTROPHILS NFR BLD: 68.4 % (ref 38–73)
NONHDLC SERPL-MCNC: 171 MG/DL
NRBC BLD-RTO: 0 /100 WBC
PLATELET # BLD AUTO: 255 K/UL (ref 150–450)
PMV BLD AUTO: 9.5 FL (ref 9.2–12.9)
POTASSIUM SERPL-SCNC: 4.3 MMOL/L (ref 3.5–5.1)
PROT SERPL-MCNC: 8.2 G/DL (ref 6–8.4)
RBC # BLD AUTO: 4 M/UL (ref 4.6–6.2)
SODIUM SERPL-SCNC: 142 MMOL/L (ref 136–145)
TRIGL SERPL-MCNC: 98 MG/DL (ref 30–150)
TSH SERPL DL<=0.005 MIU/L-ACNC: 2.45 UIU/ML (ref 0.4–4)
UUN UR-MCNC: 14 MG/DL (ref 2–20)
WBC # BLD AUTO: 5.01 K/UL (ref 3.9–12.7)

## 2022-12-20 PROCEDURE — 36415 COLL VENOUS BLD VENIPUNCTURE: CPT | Mod: PO | Performed by: FAMILY MEDICINE

## 2022-12-20 PROCEDURE — 80061 LIPID PANEL: CPT | Performed by: FAMILY MEDICINE

## 2022-12-20 PROCEDURE — 84153 ASSAY OF PSA TOTAL: CPT | Performed by: FAMILY MEDICINE

## 2022-12-20 PROCEDURE — 85025 COMPLETE CBC W/AUTO DIFF WBC: CPT | Mod: PO | Performed by: FAMILY MEDICINE

## 2022-12-20 PROCEDURE — 80053 COMPREHEN METABOLIC PANEL: CPT | Mod: PO | Performed by: FAMILY MEDICINE

## 2022-12-20 PROCEDURE — 84443 ASSAY THYROID STIM HORMONE: CPT | Mod: PO | Performed by: FAMILY MEDICINE

## 2023-01-04 ENCOUNTER — PATIENT MESSAGE (OUTPATIENT)
Dept: FAMILY MEDICINE | Facility: CLINIC | Age: 48
End: 2023-01-04
Payer: COMMERCIAL

## 2023-01-04 DIAGNOSIS — R40.0 DROWSY: ICD-10-CM

## 2023-01-04 DIAGNOSIS — N32.81 OVERACTIVE BLADDER: ICD-10-CM

## 2023-01-04 DIAGNOSIS — R07.9 CHEST PAIN, UNSPECIFIED TYPE: Primary | ICD-10-CM

## 2023-01-12 ENCOUNTER — HOSPITAL ENCOUNTER (OUTPATIENT)
Dept: RADIOLOGY | Facility: HOSPITAL | Age: 48
Discharge: HOME | End: 2023-01-12
Attending: FAMILY MEDICINE
Payer: COMMERCIAL

## 2023-01-12 ENCOUNTER — OFFICE VISIT (OUTPATIENT)
Dept: CARDIOLOGY | Facility: CLINIC | Age: 48
End: 2023-01-12
Payer: COMMERCIAL

## 2023-01-12 ENCOUNTER — HOSPITAL ENCOUNTER (OUTPATIENT)
Dept: CARDIOLOGY | Facility: HOSPITAL | Age: 48
Discharge: HOME | End: 2023-01-12
Attending: FAMILY MEDICINE
Payer: COMMERCIAL

## 2023-01-12 ENCOUNTER — OFFICE VISIT (OUTPATIENT)
Dept: UROLOGY | Facility: CLINIC | Age: 48
End: 2023-01-12
Payer: COMMERCIAL

## 2023-01-12 VITALS
HEART RATE: 60 BPM | WEIGHT: 189.06 LBS | DIASTOLIC BLOOD PRESSURE: 92 MMHG | OXYGEN SATURATION: 98 % | HEIGHT: 71 IN | BODY MASS INDEX: 26.47 KG/M2 | SYSTOLIC BLOOD PRESSURE: 136 MMHG

## 2023-01-12 VITALS
SYSTOLIC BLOOD PRESSURE: 128 MMHG | HEIGHT: 71 IN | BODY MASS INDEX: 26.25 KG/M2 | DIASTOLIC BLOOD PRESSURE: 89 MMHG | WEIGHT: 187.5 LBS | HEART RATE: 61 BPM

## 2023-01-12 DIAGNOSIS — R07.9 CHEST PAIN, UNSPECIFIED TYPE: ICD-10-CM

## 2023-01-12 DIAGNOSIS — R00.2 PALPITATIONS: ICD-10-CM

## 2023-01-12 DIAGNOSIS — E78.5 HYPERLIPIDEMIA, UNSPECIFIED HYPERLIPIDEMIA TYPE: ICD-10-CM

## 2023-01-12 DIAGNOSIS — R07.9 CHEST PAIN, UNSPECIFIED TYPE: Primary | ICD-10-CM

## 2023-01-12 DIAGNOSIS — R35.0 URINARY FREQUENCY: Primary | ICD-10-CM

## 2023-01-12 DIAGNOSIS — N32.81 OVERACTIVE BLADDER: ICD-10-CM

## 2023-01-12 LAB
BILIRUB SERPL-MCNC: NEGATIVE MG/DL
BLOOD URINE, POC: NEGATIVE
CLARITY, POC UA: CLEAR
COLOR, POC UA: ABNORMAL
GLUCOSE UR QL STRIP: NORMAL
KETONES UR QL STRIP: NEGATIVE
LEUKOCYTE ESTERASE URINE, POC: NEGATIVE
NITRITE, POC UA: NEGATIVE
PH, POC UA: 5
POC RESIDUAL URINE VOLUME: 0 ML (ref 0–100)
PROTEIN, POC: ABNORMAL
SPECIFIC GRAVITY, POC UA: 1.02
UROBILINOGEN, POC UA: NORMAL

## 2023-01-12 PROCEDURE — 51798 POCT BLADDER SCAN: ICD-10-PCS | Mod: S$GLB,,, | Performed by: UROLOGY

## 2023-01-12 PROCEDURE — 51798 US URINE CAPACITY MEASURE: CPT | Mod: S$GLB,,, | Performed by: UROLOGY

## 2023-01-12 PROCEDURE — 93010 EKG 12-LEAD: ICD-10-PCS | Mod: ,,, | Performed by: INTERNAL MEDICINE

## 2023-01-12 PROCEDURE — 71046 X-RAY EXAM CHEST 2 VIEWS: CPT | Mod: 26,,, | Performed by: RADIOLOGY

## 2023-01-12 PROCEDURE — 3075F SYST BP GE 130 - 139MM HG: CPT | Mod: CPTII,S$GLB,, | Performed by: INTERNAL MEDICINE

## 2023-01-12 PROCEDURE — 99203 OFFICE O/P NEW LOW 30 MIN: CPT | Mod: S$GLB,,, | Performed by: UROLOGY

## 2023-01-12 PROCEDURE — 1159F PR MEDICATION LIST DOCUMENTED IN MEDICAL RECORD: ICD-10-PCS | Mod: CPTII,S$GLB,, | Performed by: INTERNAL MEDICINE

## 2023-01-12 PROCEDURE — 71046 X-RAY EXAM CHEST 2 VIEWS: CPT | Mod: TC,FY,PO

## 2023-01-12 PROCEDURE — 99999 PR PBB SHADOW E&M-EST. PATIENT-LVL III: CPT | Mod: PBBFAC,,, | Performed by: UROLOGY

## 2023-01-12 PROCEDURE — 99204 PR OFFICE/OUTPT VISIT, NEW, LEVL IV, 45-59 MIN: ICD-10-PCS | Mod: S$GLB,,, | Performed by: INTERNAL MEDICINE

## 2023-01-12 PROCEDURE — 3080F PR MOST RECENT DIASTOLIC BLOOD PRESSURE >= 90 MM HG: ICD-10-PCS | Mod: CPTII,S$GLB,, | Performed by: INTERNAL MEDICINE

## 2023-01-12 PROCEDURE — 81002 POCT URINE DIPSTICK WITHOUT MICROSCOPE: ICD-10-PCS | Mod: S$GLB,,, | Performed by: UROLOGY

## 2023-01-12 PROCEDURE — 3008F BODY MASS INDEX DOCD: CPT | Mod: CPTII,S$GLB,, | Performed by: INTERNAL MEDICINE

## 2023-01-12 PROCEDURE — 1160F RVW MEDS BY RX/DR IN RCRD: CPT | Mod: CPTII,S$GLB,, | Performed by: UROLOGY

## 2023-01-12 PROCEDURE — 3074F PR MOST RECENT SYSTOLIC BLOOD PRESSURE < 130 MM HG: ICD-10-PCS | Mod: CPTII,S$GLB,, | Performed by: UROLOGY

## 2023-01-12 PROCEDURE — 3074F SYST BP LT 130 MM HG: CPT | Mod: CPTII,S$GLB,, | Performed by: UROLOGY

## 2023-01-12 PROCEDURE — 1160F PR REVIEW ALL MEDS BY PRESCRIBER/CLIN PHARMACIST DOCUMENTED: ICD-10-PCS | Mod: CPTII,S$GLB,, | Performed by: INTERNAL MEDICINE

## 2023-01-12 PROCEDURE — 1159F MED LIST DOCD IN RCRD: CPT | Mod: CPTII,S$GLB,, | Performed by: UROLOGY

## 2023-01-12 PROCEDURE — 99203 PR OFFICE/OUTPT VISIT, NEW, LEVL III, 30-44 MIN: ICD-10-PCS | Mod: S$GLB,,, | Performed by: UROLOGY

## 2023-01-12 PROCEDURE — 3008F BODY MASS INDEX DOCD: CPT | Mod: CPTII,S$GLB,, | Performed by: UROLOGY

## 2023-01-12 PROCEDURE — 3080F DIAST BP >= 90 MM HG: CPT | Mod: CPTII,S$GLB,, | Performed by: INTERNAL MEDICINE

## 2023-01-12 PROCEDURE — 1160F RVW MEDS BY RX/DR IN RCRD: CPT | Mod: CPTII,S$GLB,, | Performed by: INTERNAL MEDICINE

## 2023-01-12 PROCEDURE — 3008F PR BODY MASS INDEX (BMI) DOCUMENTED: ICD-10-PCS | Mod: CPTII,S$GLB,, | Performed by: INTERNAL MEDICINE

## 2023-01-12 PROCEDURE — 3079F DIAST BP 80-89 MM HG: CPT | Mod: CPTII,S$GLB,, | Performed by: UROLOGY

## 2023-01-12 PROCEDURE — 1160F PR REVIEW ALL MEDS BY PRESCRIBER/CLIN PHARMACIST DOCUMENTED: ICD-10-PCS | Mod: CPTII,S$GLB,, | Performed by: UROLOGY

## 2023-01-12 PROCEDURE — 93005 ELECTROCARDIOGRAM TRACING: CPT | Mod: PO

## 2023-01-12 PROCEDURE — 99999 PR PBB SHADOW E&M-EST. PATIENT-LVL III: ICD-10-PCS | Mod: PBBFAC,,, | Performed by: UROLOGY

## 2023-01-12 PROCEDURE — 93010 ELECTROCARDIOGRAM REPORT: CPT | Mod: ,,, | Performed by: INTERNAL MEDICINE

## 2023-01-12 PROCEDURE — 1159F MED LIST DOCD IN RCRD: CPT | Mod: CPTII,S$GLB,, | Performed by: INTERNAL MEDICINE

## 2023-01-12 PROCEDURE — 3008F PR BODY MASS INDEX (BMI) DOCUMENTED: ICD-10-PCS | Mod: CPTII,S$GLB,, | Performed by: UROLOGY

## 2023-01-12 PROCEDURE — 1159F PR MEDICATION LIST DOCUMENTED IN MEDICAL RECORD: ICD-10-PCS | Mod: CPTII,S$GLB,, | Performed by: UROLOGY

## 2023-01-12 PROCEDURE — 99999 PR PBB SHADOW E&M-EST. PATIENT-LVL III: CPT | Mod: PBBFAC,,, | Performed by: INTERNAL MEDICINE

## 2023-01-12 PROCEDURE — 99999 PR PBB SHADOW E&M-EST. PATIENT-LVL III: ICD-10-PCS | Mod: PBBFAC,,, | Performed by: INTERNAL MEDICINE

## 2023-01-12 PROCEDURE — 3075F PR MOST RECENT SYSTOLIC BLOOD PRESS GE 130-139MM HG: ICD-10-PCS | Mod: CPTII,S$GLB,, | Performed by: INTERNAL MEDICINE

## 2023-01-12 PROCEDURE — 3079F PR MOST RECENT DIASTOLIC BLOOD PRESSURE 80-89 MM HG: ICD-10-PCS | Mod: CPTII,S$GLB,, | Performed by: UROLOGY

## 2023-01-12 PROCEDURE — 71046 XR CHEST PA AND LATERAL: ICD-10-PCS | Mod: 26,,, | Performed by: RADIOLOGY

## 2023-01-12 PROCEDURE — 99204 OFFICE O/P NEW MOD 45 MIN: CPT | Mod: S$GLB,,, | Performed by: INTERNAL MEDICINE

## 2023-01-12 PROCEDURE — 81002 URINALYSIS NONAUTO W/O SCOPE: CPT | Mod: S$GLB,,, | Performed by: UROLOGY

## 2023-01-12 NOTE — PATIENT INSTRUCTIONS
Avoid bladder irritants including but not limited to caffeine, alcohol, smoking, spicy foods, acidic foods, tomato-based products, citrus, artificial sweeteners, chocolate, coffee or tea.    Slow fluid intake some.    Follow up in 3 months with bladder diary prior.

## 2023-01-12 NOTE — PROGRESS NOTES
"   Cardiology Clinic note    Subjective:   Patient ID:  Onofre Sims is a 47 y.o. male who presents for CP  Referring provider: Theodore Jamison MD    HPI:   CP: Reports had an episode of pain under the left chest area when bending down to life something (not heavy). No recurrence since then. [place]. No exertional symptoms.    Palpitations: Reports 2-3x/week. Would wake him up at night. No syncope.    HLD: Tolerating statin    SH Tobacco Never used  FH No premature CAD    Patient Active Problem List    Diagnosis Date Noted    Urinary frequency 01/12/2023    Elevated LFTs 06/20/2018    Hyperlipidemia 06/20/2018    Lipoma of torso 06/20/2018     right mid back, 6 cm on June 20, 2018      Keratoconus of both eyes 06/20/2018       Patient's Medications   New Prescriptions    No medications on file   Previous Medications    ATORVASTATIN (LIPITOR) 20 MG TABLET    Take 1 tablet by mouth once daily    OLOPATADINE (PATANOL) 0.1 % OPHTHALMIC SOLUTION    INSTILL 1 DROP INTO EACH EYE TWICE DAILY   Modified Medications    No medications on file   Discontinued Medications    No medications on file        Review of Systems   Constitutional: Negative for fever.   HENT:  Negative for nosebleeds.    Cardiovascular:  Negative for dyspnea on exertion, irregular heartbeat, leg swelling, near-syncope, orthopnea, paroxysmal nocturnal dyspnea and syncope.        As above   Respiratory:  Negative for hemoptysis.    Hematologic/Lymphatic: Negative for bleeding problem.   Skin:  Negative for poor wound healing.   Gastrointestinal:  Negative for hematochezia.   Genitourinary:  Negative for hematuria.   Neurological:  Negative for light-headedness.   Allergic/Immunologic: Negative for persistent infections.       Objective:   Vitals  Vitals:    01/12/23 1302   BP: (!) 136/92   Pulse: 60   SpO2: 98%   Weight: 85.7 kg (189 lb 0.7 oz)   Height: 5' 10.5" (1.791 m)          Physical Exam  Constitutional:       General: He is not " in acute distress.     Appearance: He is well-developed. He is not diaphoretic.   HENT:      Head: Normocephalic.   Neck:      Vascular: No JVD.   Cardiovascular:      Rate and Rhythm: Normal rate and regular rhythm.      Heart sounds: No murmur heard.    No friction rub. No gallop.   Pulmonary:      Effort: Pulmonary effort is normal. No respiratory distress.      Breath sounds: Normal breath sounds.   Abdominal:      Palpations: Abdomen is soft.      Tenderness: There is no abdominal tenderness.   Musculoskeletal:         General: No swelling.      Cervical back: Normal range of motion.   Skin:     General: Skin is warm.   Neurological:      Mental Status: He is alert.   Psychiatric:         Mood and Affect: Mood normal.           Assessment:     1. Chest pain, unspecified type    2. Hyperlipidemia, unspecified hyperlipidemia type    3. Palpitations        Plan:   Onofre Sims is a 47 y.o. male h/o HLD    EKG personally reviewed. My interpretation:  1/12/23: SR 60s, normal axis. Normal EKG. Qtc 397    CP  Likely MSK  Discussed role of exercise stress test for reassurance which he opted to proceed with as he is concerned about his heart health    Palpitations  Event monitor    HLD  Lab Results   Component Value Date    LDLCALC 151.4 12/20/2022   Statin as above      Continue with current medical plan and lifestyle changes.    Orders Placed This Encounter   Procedures    Exercise Stress - EKG     Standing Status:   Future     Standing Expiration Date:   1/12/2024     Order Specific Question:   Release to patient     Answer:   Immediate    Cardiac event monitor     Standing Status:   Future     Standing Expiration Date:   1/12/2024     Order Specific Question:   Cardiac Event Monitor     Answer:   Auto Trigger     Order Specific Question:   Release to patient     Answer:   Immediate       Follow up pending results  Return sooner for concerns or questions. If symptoms persist go to the ED    He expressed verbal  understanding and agreed with the plan      Carolina Suero MD  Interventional Cardiology  [facility]  Phone: [phone]

## 2023-01-12 NOTE — PROGRESS NOTES
Subjective:       Patient ID: Onofre Sims is a 47 y.o. male.    Chief Complaint: OAB     This is a 47 y.o.  male patient that is new to me.  The patient was referred to me by DR. Jamison for urinary frequency, ? OAB.  Notes having about a year of increased urinary frequency, post void dribbling.  No significant urgency.  Good FOS.  No dysuria or hematuria.  Drinks large volume water.  AUA SS 7/2.  Uroflow (1/23) voided 168 mL, Qmax 14.8 ml/s    IPSS Questionnaire (AUA-SS):  Over the past month    1)  Incomplete Emptying - How often have you had a sensation of not emptying your bladder completely after you finish urinating?  2 - Less than half the time   2)  Frequency - How often have you had to urinate again less than two hours after you finished urinating? 1 - Less than 1 time in 5   3)  Intermittency - How often have you found you stopped and started again several times when you urinated?  2 - Less than half the time   4) Urgency - How difficult have you found it to postpone urination?  0 - Not at all   5) Weak Stream - How often have you had a weak urinary stream?  1 - Less than 1 time in 5   6) Straining  - How often have you had to push or strain to begin urination?  0 - Not at all   7) Nocturia - How many times did you most typically get up to urinate from the time you went to bed until the time you got up in the morning?  1 - 1 time   Total score:  0-7 mild, 8-19 moderate, 20-35 severe 7   Quality of Life:  2 - Mostly Satisfied          LAST PSA  Lab Results   Component Value Date    PSA 0.16 12/20/2022    PSA 0.29 12/29/2021       Lab Results   Component Value Date    CREATININE 1.29 12/20/2022       ---  PMH/PSH/Medications/Allergies/Social history reviewed and as in chart.    Review of Systems   Constitutional:  Negative for activity change, chills and fever.   HENT:  Negative for congestion.    Respiratory:  Negative for cough, chest tightness and shortness of breath.    Cardiovascular:  Negative for  chest pain and palpitations.   Gastrointestinal:  Negative for abdominal distention, abdominal pain, nausea and vomiting.   Genitourinary:  Positive for frequency. Negative for difficulty urinating, flank pain, hematuria, penile pain, scrotal swelling and testicular pain.   Musculoskeletal:  Negative for gait problem.     Objective:      Physical Exam  HENT:      Head: Atraumatic.   Pulmonary:      Effort: Pulmonary effort is normal.   Neurological:      General: No focal deficit present.      Mental Status: He is alert and oriented to person, place, and time.       Assessment:     Problem Noted   Urinary Frequency 1/12/2023   Initial AUA SS 7/2  PVR 0  UA negative  Uroflow (1/23) voided 168 mL, Qmax 14.8 ml/s    Plan:     Avoid bladder irritants including but not limited to caffeine, alcohol, smoking, spicy foods, acidic foods, tomato-based products, citrus, artificial sweeteners, chocolate, coffee or tea.  Bladder diet  Slow fluid intake a little.  Follow up in 2-3 months with voiding diary    Rodriguez Rojas MD    The above referenced imaging and interpretations were personally reviewed.  Disclaimer: This note has been generated using voice-recognition software. There may be typographical errors that have been missed during proof-reading.

## 2023-01-23 ENCOUNTER — HOSPITAL ENCOUNTER (OUTPATIENT)
Dept: CARDIOLOGY | Facility: HOSPITAL | Age: 48
Discharge: HOME | End: 2023-01-23
Attending: INTERNAL MEDICINE
Payer: COMMERCIAL

## 2023-01-23 ENCOUNTER — CLINICAL SUPPORT (OUTPATIENT)
Dept: CARDIOLOGY | Facility: HOSPITAL | Age: 48
End: 2023-01-23
Attending: INTERNAL MEDICINE
Payer: COMMERCIAL

## 2023-01-23 ENCOUNTER — PATIENT MESSAGE (OUTPATIENT)
Dept: CARDIOLOGY | Facility: CLINIC | Age: 48
End: 2023-01-23
Payer: COMMERCIAL

## 2023-01-23 VITALS — HEIGHT: 71 IN | WEIGHT: 189 LBS | BODY MASS INDEX: 26.46 KG/M2

## 2023-01-23 DIAGNOSIS — R00.2 PALPITATIONS: ICD-10-CM

## 2023-01-23 DIAGNOSIS — R07.9 CHEST PAIN, UNSPECIFIED TYPE: ICD-10-CM

## 2023-01-23 LAB
CV STRESS BASE HR: 62 BPM
DIASTOLIC BLOOD PRESSURE: 74 MMHG
OHS CV CPX 1 MINUTE RECOVERY HEART RATE: 150 BPM
OHS CV CPX 85 PERCENT MAX PREDICTED HEART RATE MALE: 145
OHS CV CPX ESTIMATED METS: 16
OHS CV CPX MAX PREDICTED HEART RATE: 171
OHS CV CPX PATIENT IS FEMALE: 0
OHS CV CPX PATIENT IS MALE: 1
OHS CV CPX PEAK DIASTOLIC BLOOD PRESSURE: 66 MMHG
OHS CV CPX PEAK HEAR RATE: 162 BPM
OHS CV CPX PEAK RATE PRESSURE PRODUCT: NORMAL
OHS CV CPX PEAK SYSTOLIC BLOOD PRESSURE: 204 MMHG
OHS CV CPX PERCENT MAX PREDICTED HEART RATE ACHIEVED: 95
OHS CV CPX RATE PRESSURE PRODUCT PRESENTING: 7998
STRESS ECHO POST EXERCISE DUR MIN: 9 MINUTES
STRESS ECHO POST EXERCISE DUR SEC: 32 SECONDS
SYSTOLIC BLOOD PRESSURE: 129 MMHG

## 2023-01-23 PROCEDURE — 93272 CARDIAC EVENT MONITOR (CUPID ONLY): ICD-10-PCS | Mod: ,,, | Performed by: INTERNAL MEDICINE

## 2023-01-23 PROCEDURE — 93018 CV STRESS TEST I&R ONLY: CPT | Mod: ,,, | Performed by: INTERNAL MEDICINE

## 2023-01-23 PROCEDURE — 93270 REMOTE 30 DAY ECG REV/REPORT: CPT

## 2023-01-23 PROCEDURE — 93016 EXERCISE STRESS - EKG (CUPID ONLY): ICD-10-PCS | Mod: ,,, | Performed by: INTERNAL MEDICINE

## 2023-01-23 PROCEDURE — 93017 CV STRESS TEST TRACING ONLY: CPT

## 2023-01-23 PROCEDURE — 93272 ECG/REVIEW INTERPRET ONLY: CPT | Mod: ,,, | Performed by: INTERNAL MEDICINE

## 2023-01-23 PROCEDURE — 93018 EXERCISE STRESS - EKG (CUPID ONLY): ICD-10-PCS | Mod: ,,, | Performed by: INTERNAL MEDICINE

## 2023-01-23 PROCEDURE — 93016 CV STRESS TEST SUPVJ ONLY: CPT | Mod: ,,, | Performed by: INTERNAL MEDICINE

## 2023-02-13 ENCOUNTER — PATIENT MESSAGE (OUTPATIENT)
Dept: CARDIOLOGY | Facility: CLINIC | Age: 48
End: 2023-02-13
Payer: COMMERCIAL

## 2023-04-02 ENCOUNTER — PATIENT MESSAGE (OUTPATIENT)
Dept: FAMILY MEDICINE | Facility: CLINIC | Age: 48
End: 2023-04-02
Payer: COMMERCIAL

## 2023-04-02 DIAGNOSIS — F39 MOOD DISORDER: Primary | ICD-10-CM

## 2023-04-10 ENCOUNTER — PATIENT MESSAGE (OUTPATIENT)
Dept: FAMILY MEDICINE | Facility: CLINIC | Age: 48
End: 2023-04-10
Payer: COMMERCIAL

## 2023-04-11 ENCOUNTER — PATIENT MESSAGE (OUTPATIENT)
Dept: ADMINISTRATIVE | Facility: HOSPITAL | Age: 48
End: 2023-04-11
Payer: COMMERCIAL

## 2023-04-13 ENCOUNTER — OFFICE VISIT (OUTPATIENT)
Dept: UROLOGY | Facility: CLINIC | Age: 48
End: 2023-04-13
Payer: COMMERCIAL

## 2023-04-13 VITALS
SYSTOLIC BLOOD PRESSURE: 134 MMHG | HEIGHT: 71 IN | DIASTOLIC BLOOD PRESSURE: 87 MMHG | HEART RATE: 69 BPM | BODY MASS INDEX: 25.63 KG/M2 | WEIGHT: 183.06 LBS

## 2023-04-13 DIAGNOSIS — R35.0 URINARY FREQUENCY: Primary | ICD-10-CM

## 2023-04-13 LAB
BILIRUB SERPL-MCNC: NEGATIVE MG/DL
BLOOD URINE, POC: ABNORMAL
CLARITY, POC UA: CLEAR
COLOR, POC UA: ABNORMAL
GLUCOSE UR QL STRIP: NORMAL
KETONES UR QL STRIP: NEGATIVE
LEUKOCYTE ESTERASE URINE, POC: NEGATIVE
MICROSCOPIC COMMENT: ABNORMAL
NITRITE, POC UA: NEGATIVE
PH, POC UA: 5
POC RESIDUAL URINE VOLUME: 0 ML (ref 0–100)
PROTEIN, POC: ABNORMAL
RBC #/AREA URNS AUTO: 2 /HPF (ref 0–4)
SPECIFIC GRAVITY, POC UA: 1.02
UROBILINOGEN, POC UA: NORMAL
WBC #/AREA URNS AUTO: 8 /HPF (ref 0–5)

## 2023-04-13 PROCEDURE — 51798 POCT BLADDER SCAN: ICD-10-PCS | Mod: S$GLB,,, | Performed by: UROLOGY

## 2023-04-13 PROCEDURE — 51798 US URINE CAPACITY MEASURE: CPT | Mod: S$GLB,,, | Performed by: UROLOGY

## 2023-04-13 PROCEDURE — 3008F BODY MASS INDEX DOCD: CPT | Mod: CPTII,S$GLB,, | Performed by: UROLOGY

## 2023-04-13 PROCEDURE — 3075F PR MOST RECENT SYSTOLIC BLOOD PRESS GE 130-139MM HG: ICD-10-PCS | Mod: CPTII,S$GLB,, | Performed by: UROLOGY

## 2023-04-13 PROCEDURE — 99213 OFFICE O/P EST LOW 20 MIN: CPT | Mod: S$GLB,,, | Performed by: UROLOGY

## 2023-04-13 PROCEDURE — 1159F MED LIST DOCD IN RCRD: CPT | Mod: CPTII,S$GLB,, | Performed by: UROLOGY

## 2023-04-13 PROCEDURE — 3079F PR MOST RECENT DIASTOLIC BLOOD PRESSURE 80-89 MM HG: ICD-10-PCS | Mod: CPTII,S$GLB,, | Performed by: UROLOGY

## 2023-04-13 PROCEDURE — 81002 URINALYSIS NONAUTO W/O SCOPE: CPT | Mod: S$GLB,,, | Performed by: UROLOGY

## 2023-04-13 PROCEDURE — 99213 PR OFFICE/OUTPT VISIT, EST, LEVL III, 20-29 MIN: ICD-10-PCS | Mod: S$GLB,,, | Performed by: UROLOGY

## 2023-04-13 PROCEDURE — 99999 PR PBB SHADOW E&M-EST. PATIENT-LVL III: CPT | Mod: PBBFAC,,, | Performed by: UROLOGY

## 2023-04-13 PROCEDURE — 1160F PR REVIEW ALL MEDS BY PRESCRIBER/CLIN PHARMACIST DOCUMENTED: ICD-10-PCS | Mod: CPTII,S$GLB,, | Performed by: UROLOGY

## 2023-04-13 PROCEDURE — 81001 URINALYSIS AUTO W/SCOPE: CPT | Performed by: UROLOGY

## 2023-04-13 PROCEDURE — 99999 PR PBB SHADOW E&M-EST. PATIENT-LVL III: ICD-10-PCS | Mod: PBBFAC,,, | Performed by: UROLOGY

## 2023-04-13 PROCEDURE — 81002 POCT URINE DIPSTICK WITHOUT MICROSCOPE: ICD-10-PCS | Mod: S$GLB,,, | Performed by: UROLOGY

## 2023-04-13 PROCEDURE — 3008F PR BODY MASS INDEX (BMI) DOCUMENTED: ICD-10-PCS | Mod: CPTII,S$GLB,, | Performed by: UROLOGY

## 2023-04-13 PROCEDURE — 1160F RVW MEDS BY RX/DR IN RCRD: CPT | Mod: CPTII,S$GLB,, | Performed by: UROLOGY

## 2023-04-13 PROCEDURE — 1159F PR MEDICATION LIST DOCUMENTED IN MEDICAL RECORD: ICD-10-PCS | Mod: CPTII,S$GLB,, | Performed by: UROLOGY

## 2023-04-13 PROCEDURE — 3075F SYST BP GE 130 - 139MM HG: CPT | Mod: CPTII,S$GLB,, | Performed by: UROLOGY

## 2023-04-13 PROCEDURE — 3079F DIAST BP 80-89 MM HG: CPT | Mod: CPTII,S$GLB,, | Performed by: UROLOGY

## 2023-04-13 RX ORDER — CYCLOBENZAPRINE HCL 5 MG
5 TABLET ORAL
COMMUNITY
Start: 2023-04-01 | End: 2023-06-01

## 2023-04-13 NOTE — PROGRESS NOTES
Subjective:       Patient ID: Onofre Sims is a 47 y.o. male.    Chief Complaint: No chief complaint on file.     This is a 47 y.o.  male patient with urinary frequency, ? OAB.  Notes having about a year of increased urinary frequency, post void dribbling.  No significant urgency.  Good FOS.  No dysuria or hematuria.  Drinks large volume water.  AUA SS 7/2.  Uroflow (1/23) voided 168 mL, Qmax 14.8 ml/s  4/13/23: PVR - and urine trace blood.  No significant LUTs at current.      IPSS Questionnaire (AUA-SS):  Over the past month    1)  Incomplete Emptying - How often have you had a sensation of not emptying your bladder completely after you finish urinating?  2 - Less than half the time   2)  Frequency - How often have you had to urinate again less than two hours after you finished urinating? 1 - Less than 1 time in 5   3)  Intermittency - How often have you found you stopped and started again several times when you urinated?  2 - Less than half the time   4) Urgency - How difficult have you found it to postpone urination?  0 - Not at all   5) Weak Stream - How often have you had a weak urinary stream?  1 - Less than 1 time in 5   6) Straining  - How often have you had to push or strain to begin urination?  0 - Not at all   7) Nocturia - How many times did you most typically get up to urinate from the time you went to bed until the time you got up in the morning?  1 - 1 time   Total score:  0-7 mild, 8-19 moderate, 20-35 severe 7   Quality of Life:  2 - Mostly Satisfied          LAST PSA  Lab Results   Component Value Date    PSA 0.16 12/20/2022    PSA 0.29 12/29/2021       Lab Results   Component Value Date    CREATININE 1.29 12/20/2022       ---  PMH/PSH/Medications/Allergies/Social history reviewed and as in chart.    Review of Systems   Constitutional:  Negative for activity change, chills and fever.   HENT:  Negative for congestion.    Respiratory:  Negative for cough, chest tightness and shortness of breath.     Cardiovascular:  Negative for chest pain and palpitations.   Gastrointestinal:  Negative for abdominal distention, abdominal pain, nausea and vomiting.   Genitourinary:  Positive for frequency. Negative for difficulty urinating, flank pain, hematuria, penile pain, scrotal swelling and testicular pain.   Musculoskeletal:  Negative for gait problem.     Objective:      Physical Exam  HENT:      Head: Atraumatic.   Pulmonary:      Effort: Pulmonary effort is normal.   Neurological:      General: No focal deficit present.      Mental Status: He is alert and oriented to person, place, and time.       Assessment:     Problem Noted   Urinary Frequency 1/12/2023   Initial AUA SS 7/2  PVR 0  UA negative  Uroflow (1/23) voided 168 mL, Qmax 14.8 ml/s    Plan:     UA with microscopy  Minimal LUTs at current, likely component of dysfunctional voiding as worsening frequency with stress  Follow up in 1 year, will notify of UA results.     Rodriguez Rojas MD    The above referenced imaging and interpretations were personally reviewed.  Disclaimer: This note has been generated using voice-recognition software. There may be typographical errors that have been missed during proof-reading.

## 2023-05-11 NOTE — PROGRESS NOTES
Called to discuss palpitations. Reports improved and not as frequent. Reassuring monitor results with no abnormal rhythm abnormalities.

## 2023-05-22 ENCOUNTER — PATIENT MESSAGE (OUTPATIENT)
Dept: FAMILY MEDICINE | Facility: CLINIC | Age: 48
End: 2023-05-22
Payer: COMMERCIAL

## 2023-05-22 DIAGNOSIS — R73.9 HYPERGLYCEMIA: Primary | ICD-10-CM

## 2023-05-24 ENCOUNTER — LAB VISIT (OUTPATIENT)
Dept: LAB | Facility: HOSPITAL | Age: 48
End: 2023-05-24
Attending: FAMILY MEDICINE
Payer: COMMERCIAL

## 2023-05-24 ENCOUNTER — PATIENT MESSAGE (OUTPATIENT)
Dept: FAMILY MEDICINE | Facility: CLINIC | Age: 48
End: 2023-05-24
Payer: COMMERCIAL

## 2023-05-24 DIAGNOSIS — R73.9 HYPERGLYCEMIA: ICD-10-CM

## 2023-05-24 LAB
ESTIMATED AVG GLUCOSE: 88 MG/DL (ref 68–131)
HBA1C MFR BLD: 4.7 % (ref 4–5.6)

## 2023-05-24 PROCEDURE — 83036 HEMOGLOBIN GLYCOSYLATED A1C: CPT | Performed by: FAMILY MEDICINE

## 2023-05-24 PROCEDURE — 36415 COLL VENOUS BLD VENIPUNCTURE: CPT | Mod: PO | Performed by: FAMILY MEDICINE

## 2023-05-25 ENCOUNTER — OFFICE VISIT (OUTPATIENT)
Dept: FAMILY MEDICINE | Facility: CLINIC | Age: 48
End: 2023-05-25
Payer: COMMERCIAL

## 2023-05-25 VITALS
BODY MASS INDEX: 27.06 KG/M2 | HEIGHT: 71 IN | OXYGEN SATURATION: 97 % | HEART RATE: 81 BPM | DIASTOLIC BLOOD PRESSURE: 70 MMHG | SYSTOLIC BLOOD PRESSURE: 116 MMHG | WEIGHT: 193.25 LBS | TEMPERATURE: 98 F

## 2023-05-25 DIAGNOSIS — N41.1 CHRONIC PROSTATITIS: ICD-10-CM

## 2023-05-25 DIAGNOSIS — R35.0 URINARY FREQUENCY: Primary | ICD-10-CM

## 2023-05-25 PROCEDURE — 3078F PR MOST RECENT DIASTOLIC BLOOD PRESSURE < 80 MM HG: ICD-10-PCS | Mod: CPTII,S$GLB,, | Performed by: FAMILY MEDICINE

## 2023-05-25 PROCEDURE — 3008F PR BODY MASS INDEX (BMI) DOCUMENTED: ICD-10-PCS | Mod: CPTII,S$GLB,, | Performed by: FAMILY MEDICINE

## 2023-05-25 PROCEDURE — 3074F PR MOST RECENT SYSTOLIC BLOOD PRESSURE < 130 MM HG: ICD-10-PCS | Mod: CPTII,S$GLB,, | Performed by: FAMILY MEDICINE

## 2023-05-25 PROCEDURE — 3008F BODY MASS INDEX DOCD: CPT | Mod: CPTII,S$GLB,, | Performed by: FAMILY MEDICINE

## 2023-05-25 PROCEDURE — 1159F PR MEDICATION LIST DOCUMENTED IN MEDICAL RECORD: ICD-10-PCS | Mod: CPTII,S$GLB,, | Performed by: FAMILY MEDICINE

## 2023-05-25 PROCEDURE — 3044F HG A1C LEVEL LT 7.0%: CPT | Mod: CPTII,S$GLB,, | Performed by: FAMILY MEDICINE

## 2023-05-25 PROCEDURE — 99214 OFFICE O/P EST MOD 30 MIN: CPT | Mod: S$GLB,,, | Performed by: FAMILY MEDICINE

## 2023-05-25 PROCEDURE — 1160F PR REVIEW ALL MEDS BY PRESCRIBER/CLIN PHARMACIST DOCUMENTED: ICD-10-PCS | Mod: CPTII,S$GLB,, | Performed by: FAMILY MEDICINE

## 2023-05-25 PROCEDURE — 3044F PR MOST RECENT HEMOGLOBIN A1C LEVEL <7.0%: ICD-10-PCS | Mod: CPTII,S$GLB,, | Performed by: FAMILY MEDICINE

## 2023-05-25 PROCEDURE — 3078F DIAST BP <80 MM HG: CPT | Mod: CPTII,S$GLB,, | Performed by: FAMILY MEDICINE

## 2023-05-25 PROCEDURE — 99214 PR OFFICE/OUTPT VISIT, EST, LEVL IV, 30-39 MIN: ICD-10-PCS | Mod: S$GLB,,, | Performed by: FAMILY MEDICINE

## 2023-05-25 PROCEDURE — 1159F MED LIST DOCD IN RCRD: CPT | Mod: CPTII,S$GLB,, | Performed by: FAMILY MEDICINE

## 2023-05-25 PROCEDURE — 1160F RVW MEDS BY RX/DR IN RCRD: CPT | Mod: CPTII,S$GLB,, | Performed by: FAMILY MEDICINE

## 2023-05-25 PROCEDURE — 3074F SYST BP LT 130 MM HG: CPT | Mod: CPTII,S$GLB,, | Performed by: FAMILY MEDICINE

## 2023-05-25 RX ORDER — SILDENAFIL 25 MG/1
25 TABLET, FILM COATED ORAL DAILY PRN
Qty: 10 TABLET | Refills: 1 | Status: SHIPPED | OUTPATIENT
Start: 2023-05-25 | End: 2023-08-31 | Stop reason: SDUPTHER

## 2023-05-25 RX ORDER — CIPROFLOXACIN 500 MG/1
500 TABLET ORAL 2 TIMES DAILY
Qty: 60 TABLET | Refills: 0 | Status: SHIPPED | OUTPATIENT
Start: 2023-05-25 | End: 2023-12-22

## 2023-05-25 NOTE — PROGRESS NOTES
"Subjective:      Patient ID: Onofre Sims is a 47 y.o. male.    Chief Complaint: Urinary Frequency      Vitals:    05/25/23 1507   BP: 116/70   Pulse: 81   Temp: 98.3 °F (36.8 °C)   TempSrc: Oral   SpO2: 97%   Weight: 87.6 kg (193 lb 3.7 oz)   Height: 5' 11" (1.803 m)        HPI   2 issues, urinate frequently, good sttream, feels urge to go and know lower stomach  Need something like viagra    Problem List  Patient Active Problem List   Diagnosis    Elevated LFTs    Hyperlipidemia    Lipoma of torso    Keratoconus of both eyes    Urinary frequency        ALLERGIES: Review of patient's allergies indicates:  No Known Allergies    MEDS:   Current Outpatient Medications:     olopatadine (PATANOL) 0.1 % ophthalmic solution, INSTILL 1 DROP INTO EACH EYE TWICE DAILY, Disp: 5 mL, Rfl: 0    atorvastatin (LIPITOR) 20 MG tablet, Take 1 tablet (20 mg total) by mouth once daily., Disp: 90 tablet, Rfl: 3    ciprofloxacin HCl (CIPRO) 500 MG tablet, Take 1 tablet (500 mg total) by mouth 2 (two) times daily., Disp: 60 tablet, Rfl: 0    sildenafiL (VIAGRA) 25 MG tablet, Take 1 tablet (25 mg total) by mouth daily as needed for Erectile Dysfunction., Disp: 10 tablet, Rfl: 1      History:  Current Providers as of 5/25/2023  PCP: Theodore Jamison MD  Care Team Provider: Fiordaliza Alvarado LPN  Care Team Provider: Venus Manning LPN  Care Team Provider: RADHA Butts Jr., OD  Care Team Provider: Orlando Hernandez MD  Encounter Provider: Theodore Jamison MD, starting on Thu May 25, 2023 12:00 AM  Referring Provider: not found, starting on Thu May 25, 2023 12:00 AM  Consulting Physician: Theodore Jamison MD, starting on Thu May 25, 2023  3:05 PM, ending on Thu May 25, 2023  3:56 PM (Inactive)   Past Medical History:   Diagnosis Date    Anxiety     Depression     Hyperlipidemia     Keratoconus      Past Surgical History:   Procedure Laterality Date    BREAST SURGERY      [place], enlargement, not cancer     Social History     Tobacco Use    " Smoking status: Never     Passive exposure: Never    Smokeless tobacco: Never   Substance Use Topics    Alcohol use: Yes     Comment: socially    Drug use: No         Review of Systems   Constitutional:  Negative for appetite change, fatigue, fever and unexpected weight change.   HENT:  Negative for congestion, ear pain, sinus pressure and sore throat.    Eyes:  Negative for pain and visual disturbance.   Respiratory:  Negative for shortness of breath.    Cardiovascular:  Negative for chest pain.   Gastrointestinal:  Negative for abdominal pain, constipation and diarrhea.   Endocrine: Negative for polyuria.   Genitourinary:  Positive for frequency. Negative for difficulty urinating.   Musculoskeletal:  Negative for arthralgias, back pain and myalgias.   Skin:  Negative for color change.   Allergic/Immunologic: Negative.    Neurological:  Negative for syncope, weakness and headaches.   Hematological:  Does not bruise/bleed easily.   Psychiatric/Behavioral:  Negative for dysphoric mood and suicidal ideas. The patient is not nervous/anxious.    All other systems reviewed and are negative.  Objective:     Physical Exam  Vitals and nursing note reviewed.   Constitutional:       General: He is not in acute distress.     Appearance: He is well-developed. He is not diaphoretic.   HENT:      Head: Normocephalic and atraumatic.      Right Ear: External ear normal.      Left Ear: External ear normal.      Mouth/Throat:      Pharynx: No oropharyngeal exudate.   Eyes:      General: No scleral icterus.        Right eye: No discharge.         Left eye: No discharge.      Conjunctiva/sclera: Conjunctivae normal.      Pupils: Pupils are equal, round, and reactive to light.   Neck:      Thyroid: No thyromegaly.      Vascular: No JVD.      Trachea: No tracheal deviation.   Cardiovascular:      Rate and Rhythm: Normal rate and regular rhythm.      Heart sounds: No murmur heard.    No friction rub. No gallop.   Pulmonary:      Effort:  Pulmonary effort is normal. No respiratory distress.      Breath sounds: Normal breath sounds. No stridor. No wheezing or rales.   Chest:      Chest wall: No tenderness.   Abdominal:      General: There is no distension.      Palpations: Abdomen is soft. There is no mass.      Tenderness: There is no abdominal tenderness. There is no guarding or rebound.   Genitourinary:     Penis: Normal.       Testes: Normal.      Prostate: Normal.      Rectum: Normal.   Musculoskeletal:         General: No tenderness. Normal range of motion.      Cervical back: Normal range of motion and neck supple.   Lymphadenopathy:      Cervical: No cervical adenopathy.   Skin:     General: Skin is warm and dry.      Coloration: Skin is not pale.      Findings: No erythema or rash.   Neurological:      Mental Status: He is alert and oriented to person, place, and time.      Cranial Nerves: No cranial nerve deficit.      Motor: No abnormal muscle tone.      Coordination: Coordination normal.      Deep Tendon Reflexes: Reflexes are normal and symmetric. Reflexes normal.   Psychiatric:         Behavior: Behavior normal.         Thought Content: Thought content normal.         Judgment: Judgment normal.           Assessment:     1. Urinary frequency    2. Chronic prostatitis      Plan:        Medication List            Accurate as of May 25, 2023 11:59 PM. If you have any questions, ask your nurse or doctor.                START taking these medications      ciprofloxacin HCl 500 MG tablet  Commonly known as: CIPRO  Take 1 tablet (500 mg total) by mouth 2 (two) times daily.  Started by: Theodore Jamison MD     sildenafiL 25 MG tablet  Commonly known as: VIAGRA  Take 1 tablet (25 mg total) by mouth daily as needed for Erectile Dysfunction.  Started by: Theodore Jamison MD            CONTINUE taking these medications      atorvastatin 20 MG tablet  Commonly known as: LIPITOR  Take 1 tablet by mouth once daily     olopatadine 0.1 % ophthalmic  solution  Commonly known as: PATANOL  INSTILL 1 DROP INTO EACH EYE TWICE DAILY               Where to Get Your Medications        These medications were sent to [facility]/pharmacy #[id number] - [facility] [address] OF [address] [address]      Phone: [phone]   ciprofloxacin HCl 500 MG tablet  sildenafiL 25 MG tablet       Urinary frequency  -     Hepatitis B Surface Ab, Qualitative; Future; Expected date: 05/25/2023  -     HSV 1 & 2, IgG; Future; Expected date: 05/25/2023  -     HIV 1/2 Ag/Ab (4th Gen); Future; Expected date: 05/25/2023  -     RPR; Future; Expected date: 05/25/2023  -     C. trachomatis/N. gonorrhoeae by AMP DNA [facility]; Urine; Future  -     ciprofloxacin HCl (CIPRO) 500 MG tablet; Take 1 tablet (500 mg total) by mouth 2 (two) times daily.  Dispense: 60 tablet; Refill: 0  -     sildenafiL (VIAGRA) 25 MG tablet; Take 1 tablet (25 mg total) by mouth daily as needed for Erectile Dysfunction.  Dispense: 10 tablet; Refill: 1    Chronic prostatitis

## 2023-05-28 ENCOUNTER — PATIENT MESSAGE (OUTPATIENT)
Dept: FAMILY MEDICINE | Facility: CLINIC | Age: 48
End: 2023-05-28
Payer: COMMERCIAL

## 2023-05-29 ENCOUNTER — TELEPHONE (OUTPATIENT)
Dept: FAMILY MEDICINE | Facility: CLINIC | Age: 48
End: 2023-05-29

## 2023-05-29 ENCOUNTER — PATIENT MESSAGE (OUTPATIENT)
Dept: FAMILY MEDICINE | Facility: CLINIC | Age: 48
End: 2023-05-29

## 2023-05-29 DIAGNOSIS — E78.5 HYPERLIPIDEMIA, UNSPECIFIED HYPERLIPIDEMIA TYPE: ICD-10-CM

## 2023-05-29 DIAGNOSIS — F39 MOOD DISORDER: Primary | ICD-10-CM

## 2023-05-29 RX ORDER — ATORVASTATIN CALCIUM 20 MG/1
20 TABLET, FILM COATED ORAL DAILY
Qty: 90 TABLET | Refills: 3 | Status: SHIPPED | OUTPATIENT
Start: 2023-05-29

## 2023-05-29 NOTE — TELEPHONE ENCOUNTER
Spoke to pt. Pt confirmed that he did not need to be seen today as he was seen last week. Pt asked for hormone labs to be added to his other lab orders that he needs to complete.

## 2023-05-29 NOTE — TELEPHONE ENCOUNTER
No care due was identified.  [facility] Embedded Care Due Messages. Reference number: [id number].   5/29/2023 8:58:36 AM CDT

## 2023-05-31 ENCOUNTER — LAB VISIT (OUTPATIENT)
Dept: LAB | Facility: HOSPITAL | Age: 48
End: 2023-05-31
Attending: FAMILY MEDICINE
Payer: COMMERCIAL

## 2023-05-31 DIAGNOSIS — R35.0 URINARY FREQUENCY: ICD-10-CM

## 2023-05-31 DIAGNOSIS — F39 MOOD DISORDER: ICD-10-CM

## 2023-05-31 LAB
HBV SURFACE AB SER-ACNC: <3 MIU/ML
HBV SURFACE AB SER-ACNC: NORMAL M[IU]/ML
HIV 1+2 AB+HIV1 P24 AG SERPL QL IA: NORMAL
TESTOST SERPL-MCNC: 453 NG/DL (ref 304–1227)

## 2023-05-31 PROCEDURE — 86706 HEP B SURFACE ANTIBODY: CPT | Mod: 91,PO | Performed by: FAMILY MEDICINE

## 2023-05-31 PROCEDURE — 87389 HIV-1 AG W/HIV-1&-2 AB AG IA: CPT | Mod: PO | Performed by: FAMILY MEDICINE

## 2023-05-31 PROCEDURE — 36415 COLL VENOUS BLD VENIPUNCTURE: CPT | Mod: PO | Performed by: FAMILY MEDICINE

## 2023-05-31 PROCEDURE — 86695 HERPES SIMPLEX TYPE 1 TEST: CPT | Mod: PO | Performed by: FAMILY MEDICINE

## 2023-05-31 PROCEDURE — 84403 ASSAY OF TOTAL TESTOSTERONE: CPT | Mod: PO | Performed by: FAMILY MEDICINE

## 2023-05-31 PROCEDURE — 86592 SYPHILIS TEST NON-TREP QUAL: CPT | Mod: PO | Performed by: FAMILY MEDICINE

## 2023-06-01 ENCOUNTER — OFFICE VISIT (OUTPATIENT)
Dept: PSYCHIATRY | Facility: CLINIC | Age: 48
End: 2023-06-01
Payer: COMMERCIAL

## 2023-06-01 VITALS
SYSTOLIC BLOOD PRESSURE: 153 MMHG | HEART RATE: 69 BPM | WEIGHT: 191.13 LBS | DIASTOLIC BLOOD PRESSURE: 100 MMHG | BODY MASS INDEX: 26.76 KG/M2 | HEIGHT: 71 IN

## 2023-06-01 DIAGNOSIS — F43.23 ADJUSTMENT DISORDER WITH MIXED ANXIETY AND DEPRESSED MOOD: Primary | ICD-10-CM

## 2023-06-01 LAB
HSV1 IGG SERPL QL IA: POSITIVE
HSV2 IGG SERPL QL IA: NEGATIVE
RPR SER QL: NORMAL

## 2023-06-01 PROCEDURE — 1160F PR REVIEW ALL MEDS BY PRESCRIBER/CLIN PHARMACIST DOCUMENTED: ICD-10-PCS | Mod: CPTII,S$GLB,, | Performed by: STUDENT IN AN ORGANIZED HEALTH CARE EDUCATION/TRAINING PROGRAM

## 2023-06-01 PROCEDURE — 1159F PR MEDICATION LIST DOCUMENTED IN MEDICAL RECORD: ICD-10-PCS | Mod: CPTII,S$GLB,, | Performed by: STUDENT IN AN ORGANIZED HEALTH CARE EDUCATION/TRAINING PROGRAM

## 2023-06-01 PROCEDURE — 3008F PR BODY MASS INDEX (BMI) DOCUMENTED: ICD-10-PCS | Mod: CPTII,S$GLB,, | Performed by: STUDENT IN AN ORGANIZED HEALTH CARE EDUCATION/TRAINING PROGRAM

## 2023-06-01 PROCEDURE — 3080F PR MOST RECENT DIASTOLIC BLOOD PRESSURE >= 90 MM HG: ICD-10-PCS | Mod: CPTII,S$GLB,, | Performed by: STUDENT IN AN ORGANIZED HEALTH CARE EDUCATION/TRAINING PROGRAM

## 2023-06-01 PROCEDURE — 3077F PR MOST RECENT SYSTOLIC BLOOD PRESSURE >= 140 MM HG: ICD-10-PCS | Mod: CPTII,S$GLB,, | Performed by: STUDENT IN AN ORGANIZED HEALTH CARE EDUCATION/TRAINING PROGRAM

## 2023-06-01 PROCEDURE — 3044F PR MOST RECENT HEMOGLOBIN A1C LEVEL <7.0%: ICD-10-PCS | Mod: CPTII,S$GLB,, | Performed by: STUDENT IN AN ORGANIZED HEALTH CARE EDUCATION/TRAINING PROGRAM

## 2023-06-01 PROCEDURE — 3008F BODY MASS INDEX DOCD: CPT | Mod: CPTII,S$GLB,, | Performed by: STUDENT IN AN ORGANIZED HEALTH CARE EDUCATION/TRAINING PROGRAM

## 2023-06-01 PROCEDURE — 99999 PR PBB SHADOW E&M-EST. PATIENT-LVL III: CPT | Mod: PBBFAC,,, | Performed by: STUDENT IN AN ORGANIZED HEALTH CARE EDUCATION/TRAINING PROGRAM

## 2023-06-01 PROCEDURE — 3080F DIAST BP >= 90 MM HG: CPT | Mod: CPTII,S$GLB,, | Performed by: STUDENT IN AN ORGANIZED HEALTH CARE EDUCATION/TRAINING PROGRAM

## 2023-06-01 PROCEDURE — 1159F MED LIST DOCD IN RCRD: CPT | Mod: CPTII,S$GLB,, | Performed by: STUDENT IN AN ORGANIZED HEALTH CARE EDUCATION/TRAINING PROGRAM

## 2023-06-01 PROCEDURE — 3044F HG A1C LEVEL LT 7.0%: CPT | Mod: CPTII,S$GLB,, | Performed by: STUDENT IN AN ORGANIZED HEALTH CARE EDUCATION/TRAINING PROGRAM

## 2023-06-01 PROCEDURE — 90792 PSYCH DIAG EVAL W/MED SRVCS: CPT | Mod: S$GLB,,, | Performed by: STUDENT IN AN ORGANIZED HEALTH CARE EDUCATION/TRAINING PROGRAM

## 2023-06-01 PROCEDURE — 90792 PR PSYCHIATRIC DIAGNOSTIC EVALUATION W/MEDICAL SERVICES: ICD-10-PCS | Mod: S$GLB,,, | Performed by: STUDENT IN AN ORGANIZED HEALTH CARE EDUCATION/TRAINING PROGRAM

## 2023-06-01 PROCEDURE — 3077F SYST BP >= 140 MM HG: CPT | Mod: CPTII,S$GLB,, | Performed by: STUDENT IN AN ORGANIZED HEALTH CARE EDUCATION/TRAINING PROGRAM

## 2023-06-01 PROCEDURE — 99999 PR PBB SHADOW E&M-EST. PATIENT-LVL III: ICD-10-PCS | Mod: PBBFAC,,, | Performed by: STUDENT IN AN ORGANIZED HEALTH CARE EDUCATION/TRAINING PROGRAM

## 2023-06-01 PROCEDURE — 1160F RVW MEDS BY RX/DR IN RCRD: CPT | Mod: CPTII,S$GLB,, | Performed by: STUDENT IN AN ORGANIZED HEALTH CARE EDUCATION/TRAINING PROGRAM
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
## 2023-06-02 ENCOUNTER — PATIENT MESSAGE (OUTPATIENT)
Dept: PSYCHIATRY | Facility: CLINIC | Age: 48
End: 2023-06-02
Payer: COMMERCIAL

## 2023-06-05 ENCOUNTER — PATIENT MESSAGE (OUTPATIENT)
Dept: FAMILY MEDICINE | Facility: CLINIC | Age: 48
End: 2023-06-05
Payer: COMMERCIAL

## 2023-06-14 ENCOUNTER — PATIENT MESSAGE (OUTPATIENT)
Dept: PSYCHIATRY | Facility: CLINIC | Age: 48
End: 2023-06-14
Payer: COMMERCIAL

## 2023-06-20 ENCOUNTER — OFFICE VISIT (OUTPATIENT)
Dept: PSYCHIATRY | Facility: CLINIC | Age: 48
End: 2023-06-20
Payer: COMMERCIAL

## 2023-06-20 DIAGNOSIS — F43.23 ADJUSTMENT DISORDER WITH MIXED ANXIETY AND DEPRESSED MOOD: Primary | ICD-10-CM

## 2023-06-20 PROCEDURE — 3044F PR MOST RECENT HEMOGLOBIN A1C LEVEL <7.0%: ICD-10-PCS | Mod: CPTII,95,, | Performed by: PSYCHOLOGIST

## 2023-06-20 PROCEDURE — 90791 PSYCH DIAGNOSTIC EVALUATION: CPT | Mod: 95,,, | Performed by: PSYCHOLOGIST

## 2023-06-20 PROCEDURE — 3044F HG A1C LEVEL LT 7.0%: CPT | Mod: CPTII,95,, | Performed by: PSYCHOLOGIST

## 2023-06-20 PROCEDURE — 90791 PR PSYCHIATRIC DIAGNOSTIC EVALUATION: ICD-10-PCS | Mod: 95,,, | Performed by: PSYCHOLOGIST
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
## 2023-06-22 ENCOUNTER — PATIENT MESSAGE (OUTPATIENT)
Dept: PSYCHIATRY | Facility: CLINIC | Age: 48
End: 2023-06-22
Payer: COMMERCIAL

## 2023-07-28 ENCOUNTER — TELEPHONE (OUTPATIENT)
Dept: SLEEP MEDICINE | Facility: CLINIC | Age: 48
End: 2023-07-28
Payer: COMMERCIAL

## 2023-08-03 ENCOUNTER — TELEPHONE (OUTPATIENT)
Dept: FAMILY MEDICINE | Facility: CLINIC | Age: 48
End: 2023-08-03
Payer: COMMERCIAL

## 2023-08-03 DIAGNOSIS — Z12.11 SCREENING FOR COLON CANCER: Primary | ICD-10-CM

## 2023-08-03 NOTE — TELEPHONE ENCOUNTER
Returned patient's call; NATHAN with ph# [phone] to call & schedule. Colonoscopy orders are placed in pt's chart;     ----- Message from Claudia Gilliland sent at 8/3/2023  3:33 PM CDT -----  Type:  Needs Medical Advice    Who Called: pt    Would the patient rather a call back or a response via [facility]? call  Best Call Back Number: [phone]  Additional Information: pt requesting to have colonoscopy put in to be scheduled

## 2023-08-03 NOTE — TELEPHONE ENCOUNTER
----- Message from Kym Mistry sent at 8/3/2023  3:59 PM CDT -----  Type:  Needs Medical Advice    Who Called: pt  Would the patient rather a call back or a response via [facility]? call  Best Call Back Number: [phone]  Additional Information: pt is trying to schedule colonoscopy but orders arent visible in chart yet pt would like to schedule with office

## 2023-08-04 ENCOUNTER — TELEPHONE (OUTPATIENT)
Dept: GASTROENTEROLOGY | Facility: CLINIC | Age: 48
End: 2023-08-04
Payer: COMMERCIAL

## 2023-08-04 NOTE — TELEPHONE ENCOUNTER
Pt was told that someone from the Endo Dept will give me a call to get him scheduled. Verbal understanding.     ----- Message from Angelika Del Castillo sent at 8/4/2023  9:50 AM CDT -----  Type:  Needs Medical Advice    Who Called:  pt  Symptoms (please be specific):    How long has patient had these symptoms:    Pharmacy name and phone #:    Would the patient rather a call back or a response via [facility]?   Best Call Back Number: [phone] (M)   Additional Information: pt is f/u on 2nd order and msgs to sched for colonoscopy

## 2023-08-07 ENCOUNTER — PATIENT MESSAGE (OUTPATIENT)
Dept: FAMILY MEDICINE | Facility: CLINIC | Age: 48
End: 2023-08-07
Payer: COMMERCIAL

## 2023-08-10 ENCOUNTER — TELEPHONE (OUTPATIENT)
Dept: GASTROENTEROLOGY | Facility: CLINIC | Age: 48
End: 2023-08-10
Payer: COMMERCIAL

## 2023-08-10 ENCOUNTER — PATIENT MESSAGE (OUTPATIENT)
Dept: GASTROENTEROLOGY | Facility: CLINIC | Age: 48
End: 2023-08-10
Payer: COMMERCIAL

## 2023-08-10 RX ORDER — SODIUM, POTASSIUM,MAG SULFATES 17.5-3.13G
1 SOLUTION, RECONSTITUTED, ORAL ORAL DAILY
Qty: 1 KIT | Refills: 0 | Status: SHIPPED | OUTPATIENT
Start: 2023-08-10 | End: 2023-08-12

## 2023-08-10 NOTE — TELEPHONE ENCOUNTER
Endoscopy Scheduling Questionnaire:         Are you taking any blood thinners? no               If Yes  Have you been on them for longer than one year? N/a    2. Have you been diagnosed with Diverticulitis in past three months?  no    3. Are you on dialysis or have Kidney Disease? no    4. Previous Colonoscopy?  no         If yes Do you have a history of colon polyps?  N/a    5. Family History of Colon Cancer: no         Relation: n/a       Age at Diagnosis: n/a      6. Are you a diabetic?  no    7. Do you have a history of constipation?  no    8. Are you taking a GLP-1 Agonist? no      Procedure scheduled with Dr. Rodriguez  on  9/1/2023    The prep being used is Suprep     The patient's prep instructions were sent via patient portal.      SUPREP Instructions    [facility] [address]  Clinic Office [phone]  Endoscopy Lab [phone]    You are scheduled for a Colonoscopy with Dr. Rodriguez  on 9/1/2023 at [facility] in [place].    Check in at the Hospital -1st floor, Information desk.   Call [phone] to reschedule.    An adult friend/family member must come with you to drive you home.  You cannot drive, take a taxi, Uber/Lyft or bus to leave the Endoscopy Center alone.  If you do not have someone to drive you home, your test will be cancelled.     Please follow the directions of your doctor if you take any pills that thin your blood. If you take these meds: Aggrenox, Brilinta, Effient, Eliquis, Lovenox, Plavix, Pletal, Pradaxa, Ticilid, Xarelto or Coumadin, let the doctor's office know.    DON'T: On the morning of the test do not take insulin or pills for diabetes.     DO: On the morning of the test, do take any pills for blood pressure, heart, anti-rejection and or seizures with a small sip of water. Bring any inhalers with you.    To have a good prep, you must follow these instructions - please do not use the directions from the pharmacy.    The doctor will send a  prescription for the SUPREP.      The Day Before the test:    You can only drink CLEAR LIQUIDS the whole day before your test.  You can't eat any food for the whole day.    You CAN have:  Water, Coffee or decaf coffee (no milk or cream)  Tea  Soft drinks - regular and sugar free  Jello (green or yellow)  Apple Juice, white grape juice, white cranberry juice  Gatorade, Power Aid, Crystal Light, Vu Aid  Lemonade and Limeade  Bouillon, clear soup  Snowball, popsicles  YOU CAN'T DRINK ANYTHING RED, PURPLE ORANGE OR BLUE   YOU CAN'T DRINK ALCOHOL  ONLY DRINK WHAT IS ON THE LIST      At 5 pm the night before your test:    Pour the 1st bottle of SUPREP into the cup provided in the box. Add water to the line on the cup and mix well.  Drink the whole cup and then drink 2 more full cups of water over 1 hour.  This can be easier to drink if it is cold. You can mix it 20 minutes ahead of time and place in the refrigerator before you drink it.  You must drink it within 30-45 minutes of mixing it.  Do NOT pour the drink over ice.  You can drink it with a straw.    The Day of the test - We will call you 2 days before your test to tell you what time to get there.    5 hours before you come to the hospital (this may be in the middle of the night)  Pour the 2nd bottle of SUPREP into the cup provided in the box. Add water to the line on the cup and mix well.  Drink the whole cup and then drink 2 more full cups of water over 1 hour.  It might be easier to drink if it is cold. You can mix it 20 minutes ahead of time and place in the refrigerator before you drink it.  You must drink it within 30-45 minutes of mixing it.  Do NOT pour the drink over ice.  You can drink it with a straw.    YOU CAN'T EAT OR DRINK ANYTHING ELSE ONCE YOU FINISH THE PREP    Leave all valuables and jewelry at home. You will be at the hospital for 2-4 hours.    Call the Endoscopy department at [phone] with any questions about your procedure.

## 2023-08-19 ENCOUNTER — HOSPITAL ENCOUNTER (EMERGENCY)
Facility: HOSPITAL | Age: 48
Discharge: HOME | End: 2023-08-19
Attending: EMERGENCY MEDICINE
Payer: COMMERCIAL

## 2023-08-19 VITALS
OXYGEN SATURATION: 98 % | HEART RATE: 89 BPM | SYSTOLIC BLOOD PRESSURE: 143 MMHG | BODY MASS INDEX: 26.5 KG/M2 | RESPIRATION RATE: 18 BRPM | WEIGHT: 190 LBS | DIASTOLIC BLOOD PRESSURE: 73 MMHG | TEMPERATURE: 98 F

## 2023-08-19 DIAGNOSIS — R07.9 CHEST PAIN, UNSPECIFIED TYPE: Primary | ICD-10-CM

## 2023-08-19 DIAGNOSIS — R00.2 PALPITATIONS: ICD-10-CM

## 2023-08-19 LAB
ALBUMIN SERPL BCP-MCNC: 4.4 G/DL (ref 3.5–5.2)
ALP SERPL-CCNC: 64 U/L (ref 38–126)
ALT SERPL W/O P-5'-P-CCNC: 30 U/L (ref 10–44)
ANION GAP SERPL CALC-SCNC: 10 MMOL/L (ref 8–16)
AST SERPL-CCNC: 36 U/L (ref 15–46)
BASOPHILS # BLD AUTO: 0.04 K/UL (ref 0–0.2)
BASOPHILS NFR BLD: 0.7 % (ref 0–1.9)
BILIRUB SERPL-MCNC: 0.8 MG/DL (ref 0.1–1)
CALCIUM SERPL-MCNC: 8.9 MG/DL (ref 8.7–10.5)
CHLORIDE SERPL-SCNC: 104 MMOL/L (ref 95–110)
CO2 SERPL-SCNC: 25 MMOL/L (ref 23–29)
CREAT SERPL-MCNC: 1.15 MG/DL (ref 0.5–1.4)
DIFFERENTIAL METHOD: ABNORMAL
EOSINOPHIL # BLD AUTO: 0.1 K/UL (ref 0–0.5)
EOSINOPHIL NFR BLD: 1 % (ref 0–8)
ERYTHROCYTE [DISTWIDTH] IN BLOOD BY AUTOMATED COUNT: 11.4 % (ref 11.5–14.5)
EST. GFR  (NO RACE VARIABLE): >60 ML/MIN/1.73 M^2
GLUCOSE SERPL-MCNC: 121 MG/DL (ref 70–110)
HCT VFR BLD AUTO: 35 % (ref 40–54)
HGB BLD-MCNC: 11.8 G/DL (ref 14–18)
IMM GRANULOCYTES # BLD AUTO: 0.01 K/UL (ref 0–0.04)
IMM GRANULOCYTES NFR BLD AUTO: 0.2 % (ref 0–0.5)
LYMPHOCYTES # BLD AUTO: 1.3 K/UL (ref 1–4.8)
LYMPHOCYTES NFR BLD: 20.6 % (ref 18–48)
MAGNESIUM SERPL-MCNC: 2.1 MG/DL (ref 1.6–2.6)
MCH RBC QN AUTO: 33.6 PG (ref 27–31)
MCHC RBC AUTO-ENTMCNC: 33.7 G/DL (ref 32–36)
MCV RBC AUTO: 100 FL (ref 82–98)
MONOCYTES # BLD AUTO: 0.4 K/UL (ref 0.3–1)
MONOCYTES NFR BLD: 6.4 % (ref 4–15)
NEUTROPHILS # BLD AUTO: 4.4 K/UL (ref 1.8–7.7)
NEUTROPHILS NFR BLD: 71.1 % (ref 38–73)
NRBC BLD-RTO: 0 /100 WBC
PLATELET # BLD AUTO: 222 K/UL (ref 150–450)
PMV BLD AUTO: 9.7 FL (ref 9.2–12.9)
POTASSIUM SERPL-SCNC: 3.6 MMOL/L (ref 3.5–5.1)
PROT SERPL-MCNC: 7.8 G/DL (ref 6–8.4)
RBC # BLD AUTO: 3.51 M/UL (ref 4.6–6.2)
SODIUM SERPL-SCNC: 139 MMOL/L (ref 136–145)
TROPONIN I SERPL-MCNC: <0.012 NG/ML (ref 0.01–0.03)
UUN UR-MCNC: 16 MG/DL (ref 2–20)
WBC # BLD AUTO: 6.12 K/UL (ref 3.9–12.7)

## 2023-08-19 PROCEDURE — 80053 COMPREHEN METABOLIC PANEL: CPT | Mod: ER | Performed by: EMERGENCY MEDICINE

## 2023-08-19 PROCEDURE — 96374 THER/PROPH/DIAG INJ IV PUSH: CPT | Mod: ER

## 2023-08-19 PROCEDURE — 83735 ASSAY OF MAGNESIUM: CPT | Mod: ER | Performed by: EMERGENCY MEDICINE

## 2023-08-19 PROCEDURE — 99285 EMERGENCY DEPT VISIT HI MDM: CPT | Mod: 25,ER

## 2023-08-19 PROCEDURE — 25000003 PHARM REV CODE 250: Mod: ER | Performed by: EMERGENCY MEDICINE

## 2023-08-19 PROCEDURE — 84484 ASSAY OF TROPONIN QUANT: CPT | Mod: ER | Performed by: EMERGENCY MEDICINE

## 2023-08-19 PROCEDURE — 93010 ELECTROCARDIOGRAM REPORT: CPT | Mod: ,,, | Performed by: INTERNAL MEDICINE

## 2023-08-19 PROCEDURE — 96361 HYDRATE IV INFUSION ADD-ON: CPT | Mod: ER

## 2023-08-19 PROCEDURE — 96375 TX/PRO/DX INJ NEW DRUG ADDON: CPT | Mod: ER

## 2023-08-19 PROCEDURE — 63600175 PHARM REV CODE 636 W HCPCS: Mod: ER | Performed by: EMERGENCY MEDICINE

## 2023-08-19 PROCEDURE — 93010 EKG 12-LEAD: ICD-10-PCS | Mod: ,,, | Performed by: INTERNAL MEDICINE

## 2023-08-19 PROCEDURE — 93005 ELECTROCARDIOGRAM TRACING: CPT | Mod: ER

## 2023-08-19 PROCEDURE — 85025 COMPLETE CBC W/AUTO DIFF WBC: CPT | Mod: ER | Performed by: EMERGENCY MEDICINE

## 2023-08-19 RX ORDER — KETOROLAC TROMETHAMINE 30 MG/ML
15 INJECTION, SOLUTION INTRAMUSCULAR; INTRAVENOUS
Status: COMPLETED | OUTPATIENT
Start: 2023-08-19 | End: 2023-08-19

## 2023-08-19 RX ORDER — PROCHLORPERAZINE EDISYLATE 5 MG/ML
10 INJECTION INTRAMUSCULAR; INTRAVENOUS
Status: COMPLETED | OUTPATIENT
Start: 2023-08-19 | End: 2023-08-19

## 2023-08-19 RX ADMIN — KETOROLAC TROMETHAMINE 15 MG: 30 INJECTION, SOLUTION INTRAMUSCULAR; INTRAVENOUS at 01:08

## 2023-08-19 RX ADMIN — SODIUM CHLORIDE 1000 ML: 9 INJECTION, SOLUTION INTRAVENOUS at 01:08

## 2023-08-19 RX ADMIN — PROCHLORPERAZINE EDISYLATE 10 MG: 5 INJECTION INTRAMUSCULAR; INTRAVENOUS at 01:08

## 2023-08-19 NOTE — DISCHARGE INSTRUCTIONS
Please make sure you are drinking plenty of fluids.  Please call your primary care physician for follow-up appointment for further evaluation and management.  Please return with any new or worsening symptoms.

## 2023-08-19 NOTE — ED PROVIDER NOTES
Encounter Date: 8/19/2023       History     Chief Complaint   Patient presents with    Palpitations     Reports to ED c c/o palpitations that started 20-30 min pta. +Intermittent. +Generalized chest pain. Denies any N/V/D     47-year-old male presents emergency department complaining of palpitations, chest pressure.  Onset 20 or 30 minutes prior to arrival.  States he was at home with his significant other when it began experiencing these symptoms.  Notes some chest discomfort, he describes as pressure.  Symptoms have been constant albeit fluctuating intensity without exacerbating alleviating factors.  Denies any exertional component or shortness of breath.  Denies any vomiting or diarrhea.  No other symptoms reported at this time.       Review of patient's allergies indicates:  No Known Allergies  Past Medical History:   Diagnosis Date    Anxiety     Depression     Hyperlipidemia     Keratoconus      Past Surgical History:   Procedure Laterality Date    BREAST SURGERY      Torito Benson, joana, not cancer     Family History   Problem Relation Age of Onset    Depression Mother     Depression Father     Cancer Maternal Grandmother         breast    Diabetes Maternal Grandmother     Hypertension Maternal Grandmother     Cancer Paternal Grandmother         ovarian    No Known Problems Brother     No Known Problems Maternal Aunt     No Known Problems Maternal Uncle     No Known Problems Paternal Aunt     No Known Problems Paternal Uncle     No Known Problems Maternal Grandfather     No Known Problems Paternal Grandfather     No Known Problems Daughter     No Known Problems Brother     Amblyopia Neg Hx     Blindness Neg Hx     Cataracts Neg Hx     Glaucoma Neg Hx     Macular degeneration Neg Hx     Retinal detachment Neg Hx     Strabismus Neg Hx     Stroke Neg Hx     Thyroid disease Neg Hx      Social History     Tobacco Use    Smoking status: Never     Passive exposure: Never    Smokeless tobacco: Never   Substance Use  Topics    Alcohol use: Yes     Comment: socially    Drug use: No     Review of Systems   Constitutional:  Negative for chills and fever.   HENT:  Negative for congestion.    Respiratory:  Negative for cough and shortness of breath.    Cardiovascular:  Positive for chest pain.   Gastrointestinal:  Negative for abdominal pain.   Musculoskeletal:  Negative for back pain.   Neurological:  Negative for light-headedness and headaches.       Physical Exam     Initial Vitals [08/19/23 0036]   BP Pulse Resp Temp SpO2   (!) 163/92 70 20 98.2 °F (36.8 °C) 97 %      MAP       --         Physical Exam    Nursing note and vitals reviewed.  Constitutional: He appears well-developed and well-nourished. No distress.   HENT:   Head: Normocephalic and atraumatic.   Eyes: Conjunctivae and EOM are normal. Pupils are equal, round, and reactive to light.   Neck: Neck supple. No tracheal deviation present.   Normal range of motion.  Cardiovascular:  Normal rate and intact distal pulses.           Pulmonary/Chest: No respiratory distress.   Musculoskeletal:         General: No tenderness or edema. Normal range of motion.      Cervical back: Normal range of motion and neck supple.     Neurological: He is alert and oriented to person, place, and time. He has normal strength. No cranial nerve deficit. GCS score is 15. GCS eye subscore is 4. GCS verbal subscore is 5. GCS motor subscore is 6.   Skin: Skin is warm and dry.         ED Course   Procedures  Labs Reviewed   CBC W/ AUTO DIFFERENTIAL - Abnormal; Notable for the following components:       Result Value    RBC 3.51 (*)     Hemoglobin 11.8 (*)     Hematocrit 35.0 (*)      (*)     MCH 33.6 (*)     RDW 11.4 (*)     All other components within normal limits   COMPREHENSIVE METABOLIC PANEL - Abnormal; Notable for the following components:    Glucose 121 (*)     All other components within normal limits   TROPONIN I   MAGNESIUM     EKG Readings: (Independently Interpreted)   Initial  Reading: No STEMI. Previous EKG: Compared with most recent EKG Previous EKG Date: 1/12/2023 (Minimal change). Rhythm: Normal Sinus Rhythm. Heart Rate: 66. Ectopy: No Ectopy. ST Segments: Normal ST Segments. Axis: Normal.   EKG independently interpreted by me pending Cardiology review:            X-Rays:   Independently Interpreted Readings:   Other Readings:  Chest x-ray independently interpreted by me pending radiology review:  No acute infiltrate, no pneumothorax, no effusion    Medications   sodium chloride 0.9% bolus 1,000 mL 1,000 mL (1,000 mLs Intravenous New Bag 8/19/23 0113)   prochlorperazine injection Soln 10 mg (10 mg Intravenous Given 8/19/23 0113)   ketorolac injection 15 mg (15 mg Intravenous Given 8/19/23 0136)     Medical Decision Making  47-year-old male presents emergency department complaining of chest pain, palpitations      Differential: ACS, dissection, pneumonia, pneumothorax must skeletal, GERD, dehydration      Patient given IV fluid, Compazine, and Toradol.  On re-evaluation patient reports resolution of symptoms.  Vital signs stable.  Informed him of results and plan to discharge with instructions on home management, over-the-counter medications, follow up with primary care physician, strict return precautions given.  Patient expressed good understanding and is comfortable with discharge at this time.     Amount and/or Complexity of Data Reviewed  External Data Reviewed: ECG and notes.     Details: Reviewed most recent EKG for comparison      Reviewed most recent PCP note documenting baseline medications and past medical history  Labs: ordered.     Details: CBC without leukocytosis, mild anemia; CMP with normal renal and liver function tests, normal electrolytes; troponin within normal limits  Radiology: ordered and independent interpretation performed. Decision-making details documented in ED Course.  ECG/medicine tests: ordered and independent interpretation performed. Decision-making  details documented in ED Course.    Risk  OTC drugs.  Parenteral controlled substances.                               Clinical Impression:   Final diagnoses:  [R00.2] Palpitations  [R07.9] Chest pain, unspecified type (Primary)        ED Disposition Condition    Discharge Stable          ED Prescriptions    None       Follow-up Information       Follow up With Specialties Details Why Contact Info    Theodore Jamison MD Family Medicine Schedule an appointment as soon as possible for a visit   [address] [address]  [phone]               Laron Mitchell MD  08/19/23 0414

## 2023-08-30 ENCOUNTER — PATIENT MESSAGE (OUTPATIENT)
Dept: ENDOSCOPY | Facility: HOSPITAL | Age: 48
End: 2023-08-30
Payer: COMMERCIAL

## 2023-08-30 ENCOUNTER — TELEPHONE (OUTPATIENT)
Dept: ENDOSCOPY | Facility: HOSPITAL | Age: 48
End: 2023-08-30
Payer: COMMERCIAL

## 2023-08-30 ENCOUNTER — PATIENT MESSAGE (OUTPATIENT)
Dept: FAMILY MEDICINE | Facility: CLINIC | Age: 48
End: 2023-08-30
Payer: COMMERCIAL

## 2023-08-30 DIAGNOSIS — R35.0 URINARY FREQUENCY: ICD-10-CM

## 2023-08-30 NOTE — TELEPHONE ENCOUNTER
Spoke with patient about arrival time @ 1115.   Colon/Suprep    Prep instructions reviewed: the day before the procedure, follow a clear liquid diet all day, then start the first 1/2 of prep at 5pm and take 2nd 1/2 of prep @ 0600.  Pt must be completely NPO when prep completed @ 0815.              Medications: Do not take Insulin or oral diabetic medications the day of the procedure.  Take as prescribed: heart, seizure and blood pressure medication in the morning with a sip of water (less than an ounce).  Take any breathing medications and bring inhalers to hospital with you Leave all valuables and jewelry at home.     Wear comfortable clothes to procedure to change into hospital gown You cannot drive for 24 hours after your procedure because you will receive sedation for your procedure to make you comfortable.  A ride must be provided at discharge.

## 2023-08-31 ENCOUNTER — PATIENT MESSAGE (OUTPATIENT)
Dept: PSYCHIATRY | Facility: CLINIC | Age: 48
End: 2023-08-31
Payer: COMMERCIAL

## 2023-08-31 RX ORDER — SILDENAFIL 25 MG/1
25 TABLET, FILM COATED ORAL DAILY PRN
Qty: 10 TABLET | Refills: 11 | Status: SHIPPED | OUTPATIENT
Start: 2023-08-31 | End: 2024-08-30

## 2023-09-29 ENCOUNTER — TELEPHONE (OUTPATIENT)
Dept: ENDOSCOPY | Facility: HOSPITAL | Age: 48
End: 2023-09-29
Payer: COMMERCIAL

## 2023-09-29 NOTE — TELEPHONE ENCOUNTER
Spoke with patient about arrival time @ 1000, stated he needs to call back later to confirm..   Colon/Suprep (Inst in portal 08/10, read 08/30).

## 2023-10-02 ENCOUNTER — PATIENT MESSAGE (OUTPATIENT)
Dept: ENDOSCOPY | Facility: HOSPITAL | Age: 48
End: 2023-10-02
Payer: COMMERCIAL

## 2023-10-21 ENCOUNTER — PATIENT MESSAGE (OUTPATIENT)
Dept: FAMILY MEDICINE | Facility: CLINIC | Age: 48
End: 2023-10-21
Payer: COMMERCIAL

## 2023-10-21 DIAGNOSIS — Z20.2 STD EXPOSURE: Primary | ICD-10-CM

## 2023-10-24 ENCOUNTER — TELEPHONE (OUTPATIENT)
Dept: FAMILY MEDICINE | Facility: CLINIC | Age: 48
End: 2023-10-24
Payer: COMMERCIAL

## 2023-10-24 DIAGNOSIS — N32.81 OVERACTIVE BLADDER: Primary | ICD-10-CM

## 2023-10-25 ENCOUNTER — OFFICE VISIT (OUTPATIENT)
Dept: SLEEP MEDICINE | Facility: CLINIC | Age: 48
End: 2023-10-25
Payer: COMMERCIAL

## 2023-10-25 VITALS — BODY MASS INDEX: 26.6 KG/M2 | WEIGHT: 190 LBS | HEIGHT: 71 IN

## 2023-10-25 DIAGNOSIS — R40.0 DROWSY: ICD-10-CM

## 2023-10-25 DIAGNOSIS — G47.30 SLEEP APNEA, UNSPECIFIED TYPE: Primary | ICD-10-CM

## 2023-10-25 PROCEDURE — 3044F HG A1C LEVEL LT 7.0%: CPT | Mod: CPTII,95,, | Performed by: NURSE PRACTITIONER

## 2023-10-25 PROCEDURE — 3044F PR MOST RECENT HEMOGLOBIN A1C LEVEL <7.0%: ICD-10-PCS | Mod: CPTII,95,, | Performed by: NURSE PRACTITIONER

## 2023-10-25 PROCEDURE — 99213 PR OFFICE/OUTPT VISIT, EST, LEVL III, 20-29 MIN: ICD-10-PCS | Mod: 25,95,, | Performed by: NURSE PRACTITIONER

## 2023-10-25 PROCEDURE — 3008F BODY MASS INDEX DOCD: CPT | Mod: CPTII,95,, | Performed by: NURSE PRACTITIONER

## 2023-10-25 PROCEDURE — 99213 OFFICE O/P EST LOW 20 MIN: CPT | Mod: 25,95,, | Performed by: NURSE PRACTITIONER

## 2023-10-25 PROCEDURE — 3008F PR BODY MASS INDEX (BMI) DOCUMENTED: ICD-10-PCS | Mod: CPTII,95,, | Performed by: NURSE PRACTITIONER

## 2023-10-25 NOTE — PROGRESS NOTES
"The patient location is: [place]  The chief complaint leading to consultation is: sleep evaluation    Visit type: audiovisual    Face to Face time with patient: 12minutes of total time spent on the encounter, which includes face to face time and non-face to face time preparing to see the patient (eg, review of tests), Obtaining and/or reviewing separately obtained history, Documenting clinical information in the electronic or other health record, Independently interpreting results (not separately reported) and communicating results to the patient/family/caregiver, or Care coordination (not separately reported).Each patient to whom he or she provides medical services by telemedicine is:  (1) informed of the relationship between the physician and patient and the respective role of any other health care provider with respect to management of the patient; and (2) notified that he or she may decline to receive medical services by telemedicine and may withdraw from such care at any time.    Referred by Dr. Jamison    HISTORY OF PRESENT ILLNESS: He has never had a sleep study. Denies snoring or witnessed apneic pauses.Talks in sleep and moves a lot of moving while asleep. Heart is racing when awakened from sleep, once to use bathroom or othertimes 3-4x on a bad night. Feels sluggish. Symptoms ongoing ~ 6 +mos/maybe since 8/2021.    Heart monitoring 1/2023 w/o arrhythmias  Denies symptoms of restless legs  Denies am headaches    On todays [place] Sleepiness Scale the patient scores a 4/24.   Ht 5' 11" (1.803 m)   Wt 86.2 kg (190 lb)   BMI 26.50 kg/m²       FAMILY HISTORY: No known sleep disorders.   SOCIAL HISTORY: significant other        ASSESSMENT:   Unspecified Sleep Apnea, with symptoms of questionable snoring, un-refreshing disrupted sleep, soliloquy, frequent movements during sleep, daytime sleepiness,  with medical comorbidities of hyperlipidemia. Warrants further investigation for untreated sleep apnea.     PLAN: "   1Home Sleep Study, discussed plan of care    2. Discussed etiology of BRAYAN and potential ramifications of untreated BRAYAN, including memory changes, heart disease, HTN.  We discussed potential treatment options, which could include continuous positive airway pressure (CPAP-definitive), mandibular advancement splint by dentist    Thank you for allowing me the opportunity to participate in the care of your patient

## 2023-10-27 ENCOUNTER — TELEPHONE (OUTPATIENT)
Dept: SLEEP MEDICINE | Facility: OTHER | Age: 48
End: 2023-10-27
Payer: COMMERCIAL

## 2023-10-30 ENCOUNTER — TELEPHONE (OUTPATIENT)
Dept: ENDOSCOPY | Facility: HOSPITAL | Age: 48
End: 2023-10-30
Payer: COMMERCIAL

## 2023-10-30 NOTE — TELEPHONE ENCOUNTER
Spoke with patient about arrival time @ 1115.   Colon/Suprep    Prep instructions reviewed: the day before the procedure, follow a clear liquid diet all day, then start the first 1/2 of prep at 5pm and take 2nd 1/2 of prep @ 0600.  Pt must be completely NPO when prep completed @ 0800. Suprep inst emailed to patient.             Medications: Do not take Insulin or oral diabetic medications the day of the procedure.  Take as prescribed: heart, seizure and blood pressure medication in the morning with a sip of water (less than an ounce).  Take any breathing medications and bring inhalers to hospital with you Leave all valuables and jewelry at home.     Wear comfortable clothes to procedure to change into hospital gown You cannot drive for 24 hours after your procedure because you will receive sedation for your procedure to make you comfortable.  A ride must be provided at discharge.

## 2023-11-01 ENCOUNTER — ANESTHESIA (OUTPATIENT)
Dept: ENDOSCOPY | Facility: HOSPITAL | Age: 48
End: 2023-11-01
Payer: COMMERCIAL

## 2023-11-01 ENCOUNTER — ANESTHESIA EVENT (OUTPATIENT)
Dept: ENDOSCOPY | Facility: HOSPITAL | Age: 48
End: 2023-11-01
Payer: COMMERCIAL

## 2023-11-01 ENCOUNTER — HOSPITAL ENCOUNTER (OUTPATIENT)
Facility: HOSPITAL | Age: 48
Discharge: HOME | End: 2023-11-01
Attending: INTERNAL MEDICINE | Admitting: INTERNAL MEDICINE
Payer: COMMERCIAL

## 2023-11-01 VITALS
OXYGEN SATURATION: 99 % | SYSTOLIC BLOOD PRESSURE: 126 MMHG | HEART RATE: 53 BPM | DIASTOLIC BLOOD PRESSURE: 84 MMHG | HEIGHT: 71 IN | BODY MASS INDEX: 26.6 KG/M2 | WEIGHT: 190 LBS | RESPIRATION RATE: 14 BRPM | TEMPERATURE: 97 F

## 2023-11-01 PROCEDURE — D9220A PRA ANESTHESIA: Mod: 33,CRNA,, | Performed by: NURSE ANESTHETIST, CERTIFIED REGISTERED

## 2023-11-01 PROCEDURE — 88305 TISSUE EXAM BY PATHOLOGIST: CPT | Performed by: STUDENT IN AN ORGANIZED HEALTH CARE EDUCATION/TRAINING PROGRAM

## 2023-11-01 PROCEDURE — 88305 TISSUE EXAM BY PATHOLOGIST: CPT | Mod: 26,,, | Performed by: STUDENT IN AN ORGANIZED HEALTH CARE EDUCATION/TRAINING PROGRAM

## 2023-11-01 PROCEDURE — D9220A PRA ANESTHESIA: ICD-10-PCS | Mod: 33,CRNA,, | Performed by: NURSE ANESTHETIST, CERTIFIED REGISTERED

## 2023-11-01 PROCEDURE — 27200997: Performed by: INTERNAL MEDICINE

## 2023-11-01 PROCEDURE — 63600175 PHARM REV CODE 636 W HCPCS: Performed by: NURSE ANESTHETIST, CERTIFIED REGISTERED

## 2023-11-01 PROCEDURE — 37000009 HC ANESTHESIA EA ADD 15 MINS: Performed by: INTERNAL MEDICINE

## 2023-11-01 PROCEDURE — 25000003 PHARM REV CODE 250: Performed by: INTERNAL MEDICINE

## 2023-11-01 PROCEDURE — 25000003 PHARM REV CODE 250: Performed by: NURSE ANESTHETIST, CERTIFIED REGISTERED

## 2023-11-01 PROCEDURE — 45385 PR COLONOSCOPY,REMV LESN,SNARE: ICD-10-PCS | Mod: 33,,, | Performed by: INTERNAL MEDICINE

## 2023-11-01 PROCEDURE — 27201089 HC SNARE, DISP (ANY): Performed by: INTERNAL MEDICINE

## 2023-11-01 PROCEDURE — 88305 TISSUE EXAM BY PATHOLOGIST: ICD-10-PCS | Mod: 26,,, | Performed by: STUDENT IN AN ORGANIZED HEALTH CARE EDUCATION/TRAINING PROGRAM

## 2023-11-01 PROCEDURE — 37000008 HC ANESTHESIA 1ST 15 MINUTES: Performed by: INTERNAL MEDICINE

## 2023-11-01 PROCEDURE — D9220A PRA ANESTHESIA: ICD-10-PCS | Mod: 33,ANES,, | Performed by: STUDENT IN AN ORGANIZED HEALTH CARE EDUCATION/TRAINING PROGRAM

## 2023-11-01 PROCEDURE — D9220A PRA ANESTHESIA: Mod: 33,ANES,, | Performed by: STUDENT IN AN ORGANIZED HEALTH CARE EDUCATION/TRAINING PROGRAM

## 2023-11-01 PROCEDURE — 45385 COLONOSCOPY W/LESION REMOVAL: CPT | Mod: 33,,, | Performed by: INTERNAL MEDICINE

## 2023-11-01 PROCEDURE — 45385 COLONOSCOPY W/LESION REMOVAL: CPT | Mod: PT | Performed by: INTERNAL MEDICINE

## 2023-11-01 RX ORDER — SODIUM CHLORIDE, SODIUM LACTATE, POTASSIUM CHLORIDE, CALCIUM CHLORIDE 600; 310; 30; 20 MG/100ML; MG/100ML; MG/100ML; MG/100ML
INJECTION, SOLUTION INTRAVENOUS CONTINUOUS PRN
Status: DISCONTINUED | OUTPATIENT
Start: 2023-11-01 | End: 2023-11-01

## 2023-11-01 RX ORDER — LIDOCAINE HYDROCHLORIDE 20 MG/ML
INJECTION INTRAVENOUS
Status: DISCONTINUED | OUTPATIENT
Start: 2023-11-01 | End: 2023-11-01

## 2023-11-01 RX ORDER — PHENYLEPHRINE HYDROCHLORIDE 10 MG/ML
INJECTION INTRAVENOUS
Status: DISCONTINUED | OUTPATIENT
Start: 2023-11-01 | End: 2023-11-01

## 2023-11-01 RX ORDER — LEVOCETIRIZINE DIHYDROCHLORIDE 5 MG/1
5 TABLET, FILM COATED ORAL NIGHTLY
COMMUNITY
End: 2024-01-19 | Stop reason: SDUPTHER

## 2023-11-01 RX ORDER — AZITHROMYCIN 250 MG/1
250 TABLET, FILM COATED ORAL DAILY
COMMUNITY
End: 2023-12-22

## 2023-11-01 RX ORDER — PROPOFOL 10 MG/ML
VIAL (ML) INTRAVENOUS
Status: DISCONTINUED | OUTPATIENT
Start: 2023-11-01 | End: 2023-11-01

## 2023-11-01 RX ORDER — PROPOFOL 10 MG/ML
VIAL (ML) INTRAVENOUS CONTINUOUS PRN
Status: DISCONTINUED | OUTPATIENT
Start: 2023-11-01 | End: 2023-11-01

## 2023-11-01 RX ORDER — SODIUM CHLORIDE 9 MG/ML
INJECTION, SOLUTION INTRAVENOUS CONTINUOUS
Status: DISCONTINUED | OUTPATIENT
Start: 2023-11-01 | End: 2023-11-01 | Stop reason: HOSPADM

## 2023-11-01 RX ORDER — SODIUM CHLORIDE 0.9 % (FLUSH) 0.9 %
10 SYRINGE (ML) INJECTION
Status: DISCONTINUED | OUTPATIENT
Start: 2023-11-01 | End: 2023-11-01 | Stop reason: HOSPADM

## 2023-11-01 RX ADMIN — PROPOFOL 175 MCG/KG/MIN: 10 INJECTION, EMULSION INTRAVENOUS at 01:11

## 2023-11-01 RX ADMIN — SODIUM CHLORIDE, SODIUM LACTATE, POTASSIUM CHLORIDE, AND CALCIUM CHLORIDE: .6; .31; .03; .02 INJECTION, SOLUTION INTRAVENOUS at 12:11

## 2023-11-01 RX ADMIN — Medication 40 MG: at 01:11

## 2023-11-01 RX ADMIN — PHENYLEPHRINE HYDROCHLORIDE 100 MCG: 10 INJECTION INTRAVENOUS at 02:11

## 2023-11-01 RX ADMIN — SODIUM CHLORIDE: 9 INJECTION, SOLUTION INTRAVENOUS at 11:11

## 2023-11-01 RX ADMIN — Medication 80 MG: at 01:11

## 2023-11-01 RX ADMIN — LIDOCAINE HYDROCHLORIDE 75 MG: 20 INJECTION, SOLUTION INTRAVENOUS at 01:11

## 2023-11-01 NOTE — ANESTHESIA PREPROCEDURE EVALUATION
[facility]  Anesthesia Pre-Operative Evaluation         Patient Name: Onofre Sims  YOB: 1975  MRN: [id number]    SUBJECTIVE:     11/01/2023    Procedure(s) (LRB):  COLONOSCOPY (N/A)    Onofre Sims is a 48 y.o. male here for Procedure(s) (LRB):  COLONOSCOPY (N/A)    Drips:     Patient Active Problem List   Diagnosis    Elevated LFTs    Hyperlipidemia    Lipoma of torso    Keratoconus of both eyes    Urinary frequency       Review of patient's allergies indicates:  No Known Allergies    No current facility-administered medications on file prior to encounter.     Current Outpatient Medications on File Prior to Encounter   Medication Sig Dispense Refill    atorvastatin (LIPITOR) 20 MG tablet Take 1 tablet (20 mg total) by mouth once daily. 90 tablet 3    ciprofloxacin HCl (CIPRO) 500 MG tablet Take 1 tablet (500 mg total) by mouth 2 (two) times daily. 60 tablet 0    olopatadine (PATANOL) 0.1 % ophthalmic solution INSTILL 1 DROP INTO EACH EYE TWICE DAILY 5 mL 0       Past Surgical History:   Procedure Laterality Date    BREAST SURGERY      Mc Gaff, joana, not cancer       Social History     Socioeconomic History    Marital status: Single     Spouse name: Krystyna Sims   Occupational History     Comment: [employer]   Tobacco Use    Smoking status: Never     Passive exposure: Never    Smokeless tobacco: Never   Substance and Sexual Activity    Alcohol use: Yes     Comment: socially    Drug use: No         OBJECTIVE:     Vital Signs Range (Last 24H):  BP: ()/()   Arterial Line BP: ()/()     Significant Labs:  Lab Results   Component Value Date    WBC 6.12 08/19/2023    HGB 11.8 (L) 08/19/2023    HCT 35.0 (L) 08/19/2023     08/19/2023    CHOL 218 (H) 12/20/2022    TRIG 98 12/20/2022    HDL 47 12/20/2022    ALT 30 08/19/2023    AST 36 08/19/2023     08/19/2023    K  3.6 08/19/2023     08/19/2023    CREATININE 1.15 08/19/2023    BUN 16 08/19/2023    CO2 25 08/19/2023    TSH 2.450 12/20/2022    PSA 0.16 12/20/2022    HGBA1C 4.7 05/24/2023       Diagnostic Studies:    EKG:   Results for orders placed or performed during the hospital encounter of 08/19/23   EKG 12-lead    Collection Time: 08/19/23 12:49 AM    Narrative    Test Reason : R00.2,    Vent. Rate : 066 BPM     Atrial Rate : 066 BPM     P-R Int : 158 ms          QRS Dur : 092 ms      QT Int : 398 ms       P-R-T Axes : 072 050 053 degrees     QTc Int : 417 ms    Normal sinus rhythm  Normal ECG  When compared with ECG of 23-JAN-2023 13:19,  Previous ECG has undetermined rhythm, needs review  Confirmed by Krishan Garza MD ([id number]) on 8/22/2023 6:05:51 PM    Referred By: [email or web address]   SELF           Confirmed By:Krishan Garza MD       EKG stress 1/23/2023: 16 METs, no chest pain        Pre-op Assessment    I have reviewed the Patient Summary Reports.     I have reviewed the Nursing Notes. I have reviewed the NPO Status.   I have reviewed the Medications.     Review of Systems  Anesthesia Hx:   History of prior surgery of interest to airway management or planning:            Denies Personal Hx of Anesthesia complications.                    Social:  Social Alcohol Use, Non-Smoker       Cardiovascular:  Exercise tolerance: good    Denies Hypertension.  Denies Valvular problems/Murmurs.  Denies MI.            hyperlipidemia                             Pulmonary:    Denies COPD.      Denies Sleep Apnea.                Renal/:  Renal/ Normal  Denies Chronic Renal Disease.                Hepatic/GI:  Hepatic/GI Normal     Denies GERD. Denies Liver Disease.            Neurological:       Denies Seizures.                                Endocrine:  Endocrine Normal Denies Diabetes. Denies Hypothyroidism.  Denies Hyperthyroidism.         Psych:  Psychiatric History  depression                Physical Exam  General: Well  nourished, Cooperative, Alert and Oriented    Airway:  Mallampati: II / I  Mouth Opening: Normal  TM Distance: Normal  Tongue: Normal  Neck ROM: Normal ROM        Anesthesia Plan  Type of Anesthesia, risks & benefits discussed:    Anesthesia Type: Gen Natural Airway  Intra-op Monitoring Plan: Standard ASA Monitors  Post Op Pain Control Plan: multimodal analgesia  Induction:  IV  Informed Consent: Informed consent signed with the Patient and all parties understand the risks and agree with anesthesia plan.  All questions answered.   ASA Score: 2  Day of Surgery Review of History & Physical: H&P Update referred to the surgeon/provider.    Ready For Surgery From Anesthesia Perspective.     .

## 2023-11-01 NOTE — TRANSFER OF CARE
"Anesthesia Transfer of Care Note    Patient: Onofre Sims    Procedure(s) Performed: Procedure(s) (LRB):  COLONOSCOPY (N/A)    Patient location: GI    Anesthesia Type: general    Transport from OR: Transported from OR on room air with adequate spontaneous ventilation    Post pain: adequate analgesia    Post assessment: no apparent anesthetic complications and tolerated procedure well    Post vital signs: stable    Level of consciousness: awake    Nausea/Vomiting: no nausea/vomiting    Complications: none    Transfer of care protocol was followed      Last vitals: Visit Vitals  BP (!) 135/95 (BP Location: Right arm, Patient Position: Lying)   Pulse 63   Temp 36.6 °C (97.9 °F) (Tympanic)   Resp 18   Ht 5' 11" (1.803 m)   Wt 86.2 kg (190 lb)   SpO2 99%   BMI 26.50 kg/m²     "

## 2023-11-01 NOTE — PROVATION PATIENT INSTRUCTIONS
Discharge Summary/Instructions after an Endoscopic Procedure  Patient Name: Onofre Sims  Patient MRN: [id number]  Patient YOB: 1975  Wednesday, November 1, 2023  Keith Rodriguez MD  Dear patient,  As a result of recent federal legislation (The Federal Cures Act), you may   receive lab or pathology results from your procedure in your [facility]   account before your physician is able to contact you. Your physician or   their representative will relay the results to you with their   recommendations at their soonest availability.  Thank you,  Your health is very important to us during the Covid Crisis. Following your   procedure today, you will receive a daily text for 2 weeks asking about   signs or symptoms of Covid 19.  Please respond to this text when you   receive it so we can follow up and keep you as safe as possible.   RESTRICTIONS:  During your procedure today, you received medications for sedation.  These   medications may affect your judgment, balance and coordination.  Therefore,   for 24 hours, you have the following restrictions:   - DO NOT drive a car, operate machinery, make legal/financial decisions,   sign important papers or drink alcohol.    ACTIVITY:  Today: no heavy lifting, straining or running due to procedural   sedation/anesthesia.  The following day: return to full activity including work.  DIET:  Eat and drink normally unless instructed otherwise.     TREATMENT FOR COMMON SIDE EFFECTS:  - Mild abdominal pain, nausea, belching, bloating or excessive gas:  rest,   eat lightly and use a heating pad.  - Sore Throat: treat with throat lozenges and/or gargle with warm salt   water.  - Because air was used during the procedure, expelling large amounts of air   from your rectum or belching is normal.  - If a bowel prep was taken, you may not have a bowel movement for 1-3 days.    This is normal.  SYMPTOMS TO WATCH FOR AND REPORT TO YOUR PHYSICIAN:  1. Abdominal pain or bloating, other  than gas cramps.  2. Chest pain.  3. Back pain.  4. Signs of infection such as: chills or fever occurring within 24 hours   after the procedure.  5. Rectal bleeding, which would show as bright red, maroon, or black stools.   (A tablespoon of blood from the rectum is not serious, especially if   hemorrhoids are present.)  6. Vomiting.  7. Weakness or dizziness.  GO DIRECTLY TO THE NEAREST EMERGENCY ROOM IF YOU HAVE ANY OF THE FOLLOWING:      Difficulty breathing              Chills and/or fever over 101 F   Persistent vomiting and/or vomiting blood   Severe abdominal pain   Severe chest pain   Black, tarry stools   Bleeding- more than one tablespoon   Any other symptom or condition that you feel may need urgent attention  Your doctor recommends these additional instructions:  If any biopsies were taken, your doctors clinic will contact you in 1 to 2   weeks with any results.  - Discharge patient to home.   - Patient has a contact number available for emergencies.  The signs and   symptoms of potential delayed complications were discussed with the   patient.  Return to normal activities tomorrow.  Written discharge   instructions were provided to the patient.   - Resume previous diet.   - Continue present medications.   - Await pathology results.   - Repeat colonoscopy in 3 years for surveillance.   - Recommend that all first degree relatives (siblings, children) begin   screening colonoscopy at the age of 40 given advanced adenomas before age   60 found on todays exam.  - Avoid NSAIDs (ibuprofen, aleve, naproxen, BC / Goodys, aspirin etc) for 10   days; Aspirin is OK to continue if indicated for cardiovascular   protection.  For questions, problems or results please call your physician - Keith Rodriguez MD.  EMERGENCY PHONE NUMBER: [phone],  LAB RESULTS: [phone]  IF A COMPLICATION OR EMERGENCY SITUATION ARISES AND YOU ARE UNABLE TO REACH   YOUR PHYSICIAN - GO DIRECTLY TO THE EMERGENCY ROOM.  Keith BYRNE  MD Michael  11/1/2023 2:10:23 PM  This report has been verified and signed electronically.  Dear patient,  As a result of recent federal legislation (The Federal Cures Act), you may   receive lab or pathology results from your procedure in your [facility]   account before your physician is able to contact you. Your physician or   their representative will relay the results to you with their   recommendations at their soonest availability.  Thank you,  PROVATION

## 2023-11-01 NOTE — ANESTHESIA POSTPROCEDURE EVALUATION
Anesthesia Post Evaluation    Patient: Onofre Sims    Procedure(s) Performed: Procedure(s) (LRB):  COLONOSCOPY (N/A)    Final Anesthesia Type: general      Patient location during evaluation: PACU  Patient participation: Yes- Able to Participate  Level of consciousness: awake  Post-procedure vital signs: reviewed and stable  Pain management: adequate  Airway patency: patent    PONV status at discharge: No PONV  Anesthetic complications: no      Cardiovascular status: blood pressure returned to baseline, bradycardic and stable  Respiratory status: unassisted  Hydration status: euvolemic  Follow-up not needed.          Vitals Value Taken Time   /84 11/01/23 1445   Temp 36.1 °C (97 °F) 11/01/23 1413   Pulse 53 11/01/23 1445   Resp 14 11/01/23 1445   SpO2 99 % 11/01/23 1445         Event Time   Out of Recovery 14:46:22         Pain/Florentin Score: Florentin Score: 10 (11/1/2023  2:46 PM)

## 2023-11-01 NOTE — H&P
Short Stay Endoscopy History and Physical    PCP - Theodore Jamison MD    Procedure - Colonoscopy  ASA - per anesthesia  Mallampati - per anesthesia  History of Anesthesia problems - no  Family history Anesthesia problems - no   Plan of anesthesia - General    HPI:  This is a 48 y.o. male here for evaluation of : asymptomatic screening exam      ROS:  Constitutional: No fevers, chills, No weight loss  CV: No chest pain  Pulm: No cough, No shortness of breath  GI: see HPI  Derm: No rash    Medical History:  has a past medical history of Anxiety, Depression, Hyperlipidemia, and Keratoconus.    Surgical History:  has a past surgical history that includes Breast surgery.    Family History: family history includes Cancer in his maternal grandmother and paternal grandmother; Depression in his father and mother; Diabetes in his maternal grandmother; Hypertension in his maternal grandmother; No Known Problems in his brother, brother, daughter, maternal aunt, maternal grandfather, maternal uncle, paternal aunt, paternal grandfather, and paternal uncle.. Otherwise no colon cancer, inflammatory bowel disease, or GI malignancies.    Social History:  reports that he has never smoked. He has never been exposed to tobacco smoke. He has never used smokeless tobacco. He reports current alcohol use. He reports that he does not use drugs.    Review of patient's allergies indicates:  No Known Allergies    Medications:   Medications Prior to Admission   Medication Sig Dispense Refill Last Dose    atorvastatin (LIPITOR) 20 MG tablet Take 1 tablet (20 mg total) by mouth once daily. 90 tablet 3 Past Week    azithromycin (Z-WILD) 250 MG tablet Take 250 mg by mouth once daily.   11/1/2023    levocetirizine (XYZAL) 5 MG tablet Take 5 mg by mouth every evening.   11/1/2023    sildenafiL (VIAGRA) 25 MG tablet Take 1 tablet (25 mg total) by mouth daily as needed for Erectile Dysfunction. 10 tablet 11 Past Week    ciprofloxacin HCl (CIPRO) 500  MG tablet Take 1 tablet (500 mg total) by mouth 2 (two) times daily. 60 tablet 0 Unknown    olopatadine (PATANOL) 0.1 % ophthalmic solution INSTILL 1 DROP INTO EACH EYE TWICE DAILY 5 mL 0          Physical Exam:    Vital Signs:   Vitals:    11/01/23 1149   BP: (!) 135/95   Pulse: 63   Resp: 18   Temp: 97.9 °F (36.6 °C)       General Appearance: Well appearing in no acute distress  Eyes:    No scleral icterus  ENT: Neck supple, Lips, mucosa, and tongue normal; teeth and gums normal  Abdomen: Soft, non tender, non distended with positive bowel sounds. No hepatosplenomegaly, ascites, or mass.  Extremities: 2+ pulses, no clubbing, cyanosis or edema  Skin: No rash      Labs:  Lab Results   Component Value Date    WBC 6.12 08/19/2023    HGB 11.8 (L) 08/19/2023    HCT 35.0 (L) 08/19/2023     08/19/2023    CHOL 218 (H) 12/20/2022    TRIG 98 12/20/2022    HDL 47 12/20/2022    ALT 30 08/19/2023    AST 36 08/19/2023     08/19/2023    K 3.6 08/19/2023     08/19/2023    CREATININE 1.15 08/19/2023    BUN 16 08/19/2023    CO2 25 08/19/2023    TSH 2.450 12/20/2022    PSA 0.16 12/20/2022    HGBA1C 4.7 05/24/2023       I have explained the risks and benefits of endoscopy procedures to the patient including but not limited to bleeding, perforation, infection, and death.  The patient was asked if they understand and allowed to ask any further questions to their satisfaction.    Keith Rodriguez MD

## 2023-11-02 ENCOUNTER — LAB VISIT (OUTPATIENT)
Dept: LAB | Facility: HOSPITAL | Age: 48
End: 2023-11-02
Attending: FAMILY MEDICINE
Payer: COMMERCIAL

## 2023-11-02 ENCOUNTER — HOSPITAL ENCOUNTER (OUTPATIENT)
Dept: SLEEP MEDICINE | Facility: OTHER | Age: 48
Discharge: HOME | End: 2023-11-02
Attending: NURSE PRACTITIONER
Payer: COMMERCIAL

## 2023-11-02 DIAGNOSIS — Z20.2 STD EXPOSURE: ICD-10-CM

## 2023-11-02 DIAGNOSIS — G47.30 SLEEP APNEA, UNSPECIFIED TYPE: ICD-10-CM

## 2023-11-02 DIAGNOSIS — G47.33 OSA (OBSTRUCTIVE SLEEP APNEA): Primary | ICD-10-CM

## 2023-11-02 LAB
HCV AB SERPL QL IA: NORMAL
HIV 1+2 AB+HIV1 P24 AG SERPL QL IA: NORMAL

## 2023-11-02 PROCEDURE — 36415 COLL VENOUS BLD VENIPUNCTURE: CPT | Mod: PN | Performed by: FAMILY MEDICINE

## 2023-11-02 PROCEDURE — 86803 HEPATITIS C AB TEST: CPT | Mod: PN | Performed by: FAMILY MEDICINE

## 2023-11-02 PROCEDURE — 87389 HIV-1 AG W/HIV-1&-2 AB AG IA: CPT | Mod: PN | Performed by: FAMILY MEDICINE

## 2023-11-02 PROCEDURE — 86592 SYPHILIS TEST NON-TREP QUAL: CPT | Mod: PN | Performed by: FAMILY MEDICINE

## 2023-11-02 PROCEDURE — 86696 HERPES SIMPLEX TYPE 2 TEST: CPT | Mod: PN | Performed by: FAMILY MEDICINE

## 2023-11-02 PROCEDURE — 95800 SLP STDY UNATTENDED: CPT

## 2023-11-03 LAB
HSV1 IGG SERPL QL IA: POSITIVE
HSV2 IGG SERPL QL IA: NEGATIVE
RPR SER QL: NORMAL
RPR SER QL: NORMAL

## 2023-11-06 ENCOUNTER — TELEPHONE (OUTPATIENT)
Dept: FAMILY MEDICINE | Facility: CLINIC | Age: 48
End: 2023-11-06
Payer: COMMERCIAL

## 2023-11-06 LAB
FINAL PATHOLOGIC DIAGNOSIS: NORMAL
GROSS: NORMAL
Lab: NORMAL

## 2023-11-07 PROBLEM — G47.30 SLEEP APNEA: Status: ACTIVE | Noted: 2023-11-07

## 2023-11-15 PROBLEM — G47.33 OSA (OBSTRUCTIVE SLEEP APNEA): Status: ACTIVE | Noted: 2023-11-07

## 2023-11-15 PROCEDURE — 95806 SLEEP STUDY UNATT&RESP EFFT: CPT | Mod: 26,52,, | Performed by: PSYCHIATRY & NEUROLOGY

## 2023-11-15 PROCEDURE — 95806 PR SLEEP STUDY, UNATTENDED, SIMUL RECORD HR/O2 SAT/RESP FLOW/RESP EFFT: ICD-10-PCS | Mod: 26,52,, | Performed by: PSYCHIATRY & NEUROLOGY

## 2023-11-16 ENCOUNTER — PATIENT MESSAGE (OUTPATIENT)
Dept: SLEEP MEDICINE | Facility: CLINIC | Age: 48
End: 2023-11-16
Payer: COMMERCIAL

## 2023-11-16 DIAGNOSIS — G47.33 OSA (OBSTRUCTIVE SLEEP APNEA): Primary | ICD-10-CM

## 2023-12-19 ENCOUNTER — TELEPHONE (OUTPATIENT)
Dept: FAMILY MEDICINE | Facility: CLINIC | Age: 48
End: 2023-12-19
Payer: COMMERCIAL

## 2023-12-19 PROBLEM — F43.23 ADJUSTMENT DISORDER WITH MIXED ANXIETY AND DEPRESSED MOOD: Status: ACTIVE | Noted: 2023-12-19

## 2023-12-19 RX ORDER — PROMETHAZINE HYDROCHLORIDE AND DEXTROMETHORPHAN HYDROBROMIDE 6.25; 15 MG/5ML; MG/5ML
SYRUP ORAL
COMMUNITY
Start: 2023-11-14 | End: 2023-12-22

## 2023-12-19 RX ORDER — FLUTICASONE PROPIONATE 50 MCG
SPRAY, SUSPENSION (ML) NASAL
COMMUNITY
Start: 2023-11-26

## 2023-12-19 NOTE — TELEPHONE ENCOUNTER
----- Message from Emmanuelle Mejía sent at 12/19/2023  3:13 PM CST -----  Regarding: reschedule  Contact: [phone]  Type:  Patient Returning Call    Who Called: pt   Who Left Message for Patient: office   Does the patient know what this is regarding?: reschedule  Would the patient rather a call back or a response via [facility]?  Call   Best Call Back Number: [phone]  Additional Information:

## 2023-12-19 NOTE — TELEPHONE ENCOUNTER
I spoke with the pt  He is leaving [facility] December 27 for bp issues  Dr Hanks out of office sick    Apt scheduled to see Gracia on Firday

## 2023-12-19 NOTE — PROGRESS NOTES
Subjective     Patient ID: Onofre Sims is a 48 y.o. male.    Chief Complaint: Hypertension      Onofre Sims is a 48 y.o. male who presents today with chief complaint of elevated blood pressures. He first noted elevated blood pressure about 1 month ago in November 2023. The patient reports he has been sick since October with sinus/respiratory illness. He went to urgent care in October 2023 and was given Rx Zpack with no significant improvement. He went back to urgent care on 11/14/23 and received Rx for flonase, amoxil, and promethazine-Dextromethorphan. Since illness started in   October he has been taking OTC cold medications + xyzal intermittently. Since November appt he has been taking promethazine dextromethorphan regularly. He reports last dose cough syrup about 2 weeks ago. He does have diet high in sodium/fast food with some life stressors. Life stressors have been improving over past few weeks. He does not drink alcohol in excess, no tobacco products. He has not tried lifestyle modifications. Recent bloodwork and EKG in August 2023, reviewed.   History of inconsistent elevation in BP in past .    Cardiac symptoms include: none. Patient denies: chest pain, chest pressure/discomfort, dyspnea, exertional chest pressure/discomfort, fatigue, lower extremity edema, and orthopnea.   Cardiovascular risk factors: dyslipidemia and male gender.   Use of agents associated with hypertension: decongestants. Also with recent diagnosis of sleep apnea.   History of target organ damage: none.     See below for more information.     The following portions of the patient's history were reviewed and updated as appropriate: allergies, current medications, past family history, past medical history, past social history, past surgical history, and problem list.      Hypertension  This is a new problem. The current episode started 1 to 4 weeks ago. The problem has been waxing and waning since onset. Pertinent negatives include no  anxiety, blurred vision, chest pain, headaches, malaise/fatigue, orthopnea, PND, shortness of breath or sweats. (sinus congestion) Agents associated with hypertension include decongestants. Risk factors for coronary artery disease include dyslipidemia, stress and male gender. Past treatments include nothing. The current treatment provides no improvement. There is no history of angina, kidney disease, CAD/MI, CVA, heart failure, left ventricular hypertrophy, PVD or retinopathy. Identifiable causes of hypertension include a hypertension causing med and sleep apnea. There is no history of chronic renal disease, renovascular disease or a thyroid problem.     Past Medical History:   Diagnosis Date    Anxiety     Depression     Hyperlipidemia     Keratoconus            Current Outpatient Medications:     atorvastatin (LIPITOR) 20 MG tablet, Take 1 tablet (20 mg total) by mouth once daily., Disp: 90 tablet, Rfl: 3    fluticasone propionate (FLONASE) 50 mcg/actuation nasal spray, SMARTSI Spray(s) Both Nares Every Morning, Disp: , Rfl:     levocetirizine (XYZAL) 5 MG tablet, Take 5 mg by mouth every evening., Disp: , Rfl:     olopatadine (PATANOL) 0.1 % ophthalmic solution, INSTILL 1 DROP INTO EACH EYE TWICE DAILY, Disp: 5 mL, Rfl: 0    sildenafiL (VIAGRA) 25 MG tablet, Take 1 tablet (25 mg total) by mouth daily as needed for Erectile Dysfunction. (Patient not taking: Reported on 2023), Disp: 10 tablet, Rfl: 11       Review of Systems   Constitutional:  Negative for activity change, appetite change, chills, fatigue, fever, malaise/fatigue and unexpected weight change.   HENT:  Positive for nasal congestion and sinus pressure/congestion. Negative for trouble swallowing.    Eyes:  Negative for blurred vision and visual disturbance.   Respiratory:  Negative for cough and shortness of breath.    Cardiovascular:  Negative for chest pain, orthopnea, leg swelling and PND.   Gastrointestinal:  Negative for constipation,  "diarrhea, nausea and vomiting.   Musculoskeletal:  Negative for leg pain.   Neurological:  Negative for dizziness, weakness and headaches.   Psychiatric/Behavioral:  Negative for confusion. The patient is not nervous/anxious.    All other systems reviewed and are negative.         Objective     Vitals:    12/22/23 0813 12/22/23 0820 12/22/23 0920   BP: (!) 132/100 (!) 144/100 (!) 140/100   Pulse: 65 65    SpO2: 98%     Weight: 88 kg (194 lb)     Height: 5' 11" (1.803 m)     PainSc: 0-No pain          Physical Exam  Vitals and nursing note reviewed.   Constitutional:       General: He is not in acute distress.     Appearance: Normal appearance. He is not ill-appearing.   HENT:      Head: Normocephalic and atraumatic.      Nose: Congestion present. No nasal tenderness.      Right Sinus: No maxillary sinus tenderness or frontal sinus tenderness.      Left Sinus: No maxillary sinus tenderness or frontal sinus tenderness.   Eyes:      General: No scleral icterus.        Right eye: No discharge.         Left eye: No discharge.      Extraocular Movements: Extraocular movements intact.      Conjunctiva/sclera: Conjunctivae normal.   Cardiovascular:      Rate and Rhythm: Normal rate.   Pulmonary:      Effort: Pulmonary effort is normal. No respiratory distress.      Breath sounds: No wheezing, rhonchi or rales.   Chest:      Chest wall: No tenderness.   Musculoskeletal:      Cervical back: Normal range of motion.      Right lower leg: No edema.      Left lower leg: No edema.   Skin:     General: Skin is warm and dry.      Findings: No rash.   Neurological:      Mental Status: He is alert and oriented to person, place, and time.   Psychiatric:         Mood and Affect: Mood normal.         Behavior: Behavior normal. Behavior is cooperative.         Cognition and Memory: Cognition and memory normal.               Cardiographics  ECG, Chest xray, cardiac event monitor, cardiology and PCP notes, recent labs reviewed, see below. " Unable to do ECG, labs, or chest xray in office today due to not available in this clinic    *ECG 8/19/23  Vent. Rate : 066 BPM     Atrial Rate : 066 BPM      P-R Int : 158 ms          QRS Dur : 092 ms       QT Int : 398 ms       P-R-T Axes : 072 050 053 degrees      QTc Int : 417 ms     Normal sinus rhythm   Normal ECG   When compared with ECG of 23-JAN-2023 13:19,   Confirmed by Krishan Garza MD ([id number]) on 8/22/2023 6:05:51 PM         *Cardiac Event Monitor 1/23/23 for palpitations , reviewed  Conclusion    Negative event monitor with no clinical arrhythmias.  Symptoms corresponding with normal sinus rhythm and sinus tachycardia.        Imaging    *Chest x-ray 8/19/23 : normal.  EXAMINATION:  XR CHEST AP PORTABLE   CLINICAL HISTORY: Palpitations;.     TECHNIQUE:  Single frontal portable view of the chest was performed.     COMPARISON: None     FINDINGS:  Support devices: None     The lungs are clear, with normal appearance of pulmonary vasculature and no pleural effusion or pneumothorax.     The cardiac silhouette is normal in size. The hilar and mediastinal contours are unremarkable.     Bones are intact.     Impression:     No acute abnormality.      Electronically signed by: Ramon Alexander  Date:                                            08/19/2023  Time:                                           09:53        Lab Review for past 6 months of labs ; I personally reviewed labs  Lab Visit on 11/02/2023   Component Date Value    Chlamydia, Amplified DNA 11/02/2023 Not Detected     N gonorrhoeae, amplified* 11/02/2023 Not Detected    Lab Visit on 11/02/2023   Component Date Value    RPR 11/02/2023 Non-reactive     HIV 1/2 Ag/Ab 11/02/2023 Non-reactive     Hepatitis C Ab 11/02/2023 Non-reactive     HSV 1 IgG 11/02/2023 Positive (A)     HSV 2 IgG 11/02/2023 Negative     RPR 11/02/2023 Non-reactive    Admission on 11/01/2023, Discharged on 11/01/2023   Component Date Value    Final Pathologic Diagnos* 11/01/2023                       Value:1. Colon, transverse, polyp, polypectomy:      - Tubular Adenoma.     - Negative for high-grade dysplasia or malignancy.      Gross 11/01/2023                      Value:MRN # on requisition [id number]  MRN # on [facility] label [id number]  Received in formalin, labeled transverse colon polyps x3, are multiple fragments of rubbery lobulated and tan tissue measuring together 1.6 cm.  Submitted as received entirely. MCV--1-A        Grossed by [facility]      Disclaimer 11/01/2023 Unless the case is a 'gross only' or additional testing only, the final diagnosis for each specimen is based on a microscopic examination of appropriate tissue sections.    Admission on 08/19/2023, Discharged on 08/19/2023   Component Date Value    WBC 08/19/2023 6.12     RBC 08/19/2023 3.51 (L)     Hemoglobin 08/19/2023 11.8 (L)     Hematocrit 08/19/2023 35.0 (L)     MCV 08/19/2023 100 (H)     MCH 08/19/2023 33.6 (H)     MCHC 08/19/2023 33.7     RDW 08/19/2023 11.4 (L)     Platelets 08/19/2023 222     MPV 08/19/2023 9.7     Immature Granulocytes 08/19/2023 0.2     Gran # (ANC) 08/19/2023 4.4     Immature Grans (Abs) 08/19/2023 0.01     Lymph # 08/19/2023 1.3     Mono # 08/19/2023 0.4     Eos # 08/19/2023 0.1     Baso # 08/19/2023 0.04     nRBC 08/19/2023 0     Gran % 08/19/2023 71.1     Lymph % 08/19/2023 20.6     Mono % 08/19/2023 6.4     Eosinophil % 08/19/2023 1.0     Basophil % 08/19/2023 0.7     Differential Method 08/19/2023 Automated     Sodium 08/19/2023 139     Potassium 08/19/2023 3.6     Chloride 08/19/2023 104     CO2 08/19/2023 25     Glucose 08/19/2023 121 (H)     BUN 08/19/2023 16     Creatinine 08/19/2023 1.15     Calcium 08/19/2023 8.9     Total Protein 08/19/2023 7.8     Albumin 08/19/2023 4.4     Total Bilirubin 08/19/2023 0.8     Alkaline Phosphatase 08/19/2023 64     AST 08/19/2023 36     ALT 08/19/2023 30     Anion Gap 08/19/2023 10     eGFR 08/19/2023 >60.0     Troponin I 08/19/2023  <0.012     Magnesium 08/19/2023 2.1             Assessment and Plan     1. Elevated blood pressure reading without diagnosis of hypertension  - suspect 2/2 high sodium intake + decongestant medications/sinus congestion + sleep apnea  - Blood pressure elevated today in office on 3 readings, both arms  - Evidence of target organ damage: none.; history of inconsistent elevation of blood pressure in past   - Cardiovascular risk factors: dyslipidemia and male gender.  - home sleep study ordered 10/25/23 - not done yet- recommend getting sleep study as this may be contributing to elevated blood pressure readings.  - if BP elevated over 140/90 I recommend notifying PCP office   - if BP elevated over 160/100 with symptoms shortness of breath, chest pain, headaches, blurry vision, I recommend going to nearest  ER if out of town  - I recommend low sodium diet - handouts given DASH DIET , lifestyle modifications  - I recommend avoiding caffeine, avoid using any cold medications not labeled for tania blood pressure, avoiding NSAIDS, avoiding sudafed, and avoiding viagra at this time   - If you smoke, I recommend quit smoking.  - Patient will keep BP log and bring to follow up with PCP; recommend follow up in 4 weeks  - stop taking cough syrup, if cold medicine needed, only cold medicine approved for HBP - coricidin   - continue therapy to help manage life stressors; avoid fast food and added salt   - Plan for lifestyle modifications for 3-6 months with goal BP <140/90  - patient declined labs, EKG today    2. Nasal sinus congestion  - ongoing problem  - okay to try OTC medication if labeled for high blood pressure, coricidin brand okay to try  - discussed sinus rinse/neti pot; vicks vapor shower tablets, flonase, warm compresses to sinus region  - no sinus tenderness on exam  - follow up with pcp    3. Other hyperlipidemia  -Chronic; stable on current treatment plan; follow up with PCP  - taking statin intermittently, education  today  - recommend to take nightly as directed    4. Other sleep apnea  - recent diagnosis - needs sleep study  - recommend scheduling  - this may be contributing to hypertension     5. Bradycardia  Chronic; stable on current treatment plan; follow up with PCP    6. BMI 27.0-27.9,adult  - Recommendation for healthy diet and increasing exercise as tolerated with goal of 150min/week            Follow up in about 4 weeks (around 1/19/2024) for elevated blood pressure follow up.        Gracia Bowie, MSN, APRN, FNP-C  Family Medicine  Office [phone]      45 minutes of total time spent on the encounter, which includes face to face time and non-face to face time preparing to see the patient (eg, review of tests), Obtaining and/or reviewing separately obtained history, Documenting clinical information in the electronic or other health record, Independently interpreting results (not separately reported) and communicating results to the patient/family/caregiver, or Care coordination (not separately reported).

## 2023-12-22 ENCOUNTER — OFFICE VISIT (OUTPATIENT)
Dept: INTERNAL MEDICINE | Facility: CLINIC | Age: 48
End: 2023-12-22
Payer: COMMERCIAL

## 2023-12-22 ENCOUNTER — TELEPHONE (OUTPATIENT)
Dept: INTERNAL MEDICINE | Facility: CLINIC | Age: 48
End: 2023-12-22

## 2023-12-22 VITALS
DIASTOLIC BLOOD PRESSURE: 100 MMHG | WEIGHT: 194 LBS | SYSTOLIC BLOOD PRESSURE: 140 MMHG | HEART RATE: 65 BPM | HEIGHT: 71 IN | BODY MASS INDEX: 27.16 KG/M2 | OXYGEN SATURATION: 98 %

## 2023-12-22 DIAGNOSIS — R03.0 ELEVATED BLOOD PRESSURE READING WITHOUT DIAGNOSIS OF HYPERTENSION: Primary | ICD-10-CM

## 2023-12-22 DIAGNOSIS — R09.81 NASAL SINUS CONGESTION: ICD-10-CM

## 2023-12-22 DIAGNOSIS — R00.1 BRADYCARDIA: ICD-10-CM

## 2023-12-22 DIAGNOSIS — E78.49 OTHER HYPERLIPIDEMIA: ICD-10-CM

## 2023-12-22 DIAGNOSIS — G47.39 OTHER SLEEP APNEA: ICD-10-CM

## 2023-12-22 PROCEDURE — 1160F RVW MEDS BY RX/DR IN RCRD: CPT | Mod: CPTII,S$GLB,, | Performed by: NURSE PRACTITIONER

## 2023-12-22 PROCEDURE — 1160F PR REVIEW ALL MEDS BY PRESCRIBER/CLIN PHARMACIST DOCUMENTED: ICD-10-PCS | Mod: CPTII,S$GLB,, | Performed by: NURSE PRACTITIONER

## 2023-12-22 PROCEDURE — 1159F MED LIST DOCD IN RCRD: CPT | Mod: CPTII,S$GLB,, | Performed by: NURSE PRACTITIONER

## 2023-12-22 PROCEDURE — 99215 PR OFFICE/OUTPT VISIT, EST, LEVL V, 40-54 MIN: ICD-10-PCS | Mod: S$GLB,,, | Performed by: NURSE PRACTITIONER

## 2023-12-22 PROCEDURE — 99999 PR PBB SHADOW E&M-EST. PATIENT-LVL V: CPT | Mod: PBBFAC,,, | Performed by: NURSE PRACTITIONER

## 2023-12-22 PROCEDURE — 3008F PR BODY MASS INDEX (BMI) DOCUMENTED: ICD-10-PCS | Mod: CPTII,S$GLB,, | Performed by: NURSE PRACTITIONER

## 2023-12-22 PROCEDURE — 1159F PR MEDICATION LIST DOCUMENTED IN MEDICAL RECORD: ICD-10-PCS | Mod: CPTII,S$GLB,, | Performed by: NURSE PRACTITIONER

## 2023-12-22 PROCEDURE — 99999 PR PBB SHADOW E&M-EST. PATIENT-LVL V: ICD-10-PCS | Mod: PBBFAC,,, | Performed by: NURSE PRACTITIONER

## 2023-12-22 PROCEDURE — 99215 OFFICE O/P EST HI 40 MIN: CPT | Mod: S$GLB,,, | Performed by: NURSE PRACTITIONER

## 2023-12-22 PROCEDURE — 3008F BODY MASS INDEX DOCD: CPT | Mod: CPTII,S$GLB,, | Performed by: NURSE PRACTITIONER

## 2023-12-22 PROCEDURE — 3077F PR MOST RECENT SYSTOLIC BLOOD PRESSURE >= 140 MM HG: ICD-10-PCS | Mod: CPTII,S$GLB,, | Performed by: NURSE PRACTITIONER

## 2023-12-22 PROCEDURE — 3044F HG A1C LEVEL LT 7.0%: CPT | Mod: CPTII,S$GLB,, | Performed by: NURSE PRACTITIONER

## 2023-12-22 PROCEDURE — 3080F DIAST BP >= 90 MM HG: CPT | Mod: CPTII,S$GLB,, | Performed by: NURSE PRACTITIONER

## 2023-12-22 PROCEDURE — 3044F PR MOST RECENT HEMOGLOBIN A1C LEVEL <7.0%: ICD-10-PCS | Mod: CPTII,S$GLB,, | Performed by: NURSE PRACTITIONER

## 2023-12-22 PROCEDURE — 3080F PR MOST RECENT DIASTOLIC BLOOD PRESSURE >= 90 MM HG: ICD-10-PCS | Mod: CPTII,S$GLB,, | Performed by: NURSE PRACTITIONER

## 2023-12-22 PROCEDURE — 3077F SYST BP >= 140 MM HG: CPT | Mod: CPTII,S$GLB,, | Performed by: NURSE PRACTITIONER

## 2023-12-22 NOTE — PATIENT INSTRUCTIONS
Best proven nonpharmacologic interventions for prevention and treatment of hypertension*    Nonpharmacologic intervention Dose Approximate impact on SBP      Hypertension Normotension Reference   Weight loss Weight/body fat Best goal is ideal body weight, but aim for at least a 1 kg reduction in body weight for most adults who are overweight. Expect about 1 mmHg for every 1 kg reduction in body weight. -5 mmHg -3 mmHg [1]   Healthy diet DASH dietary pattern Consume a diet rich in fruits, vegetables, whole grains, and low-fat dairy products, with reduced content of saturated and total fat. -11 mmHg -3 mmHg [2,3]   Reduced intake of dietary sodium Dietary sodium Optimal goal is <1500 mg/day, but aim for at least a 1000 mg/day reduction in most adults. -5 to -6 mmHg -2 to -3 mmHg [4,5]   Enhanced intake of dietary potassium Dietary potassium Aim for 3500 to 5000 mg/day, preferably by consumption of a diet rich in potassium. -4 mmHg -2 mmHg [6]   Physical activity Aerobic 90 to 150 minutes/week.  65 to 75% heart rate reserve. -5 to -8 mmHg -2 to -4 mmHg [7,8]    Dynamic resistance 90 to 150 minutes/week.  50 to 80% of maximum 1 repetition weight.  6 exercises, 3 sets/exercise, 10 repetitions/set. -4 mmHg -2 mmHg [7]    Isometric resistance 4 × 2 minutes (hand ), 1 minute rest between exercises, 30 to 40% maximum voluntary contraction, 3 sessions/week.  8 to 10 weeks. -5 mmHg -4 mmHg [9,10]   Moderation in alcohol intake Alcohol consumption In individuals who drink alcohol, reduce alcohol to:¶  Men: ?2 drinks daily.  Women: ?1 drink daily. -4 mmHg -3 mmHg [11-13]     
Bulb Jose Elias-Pharynx Suction Only

## 2023-12-22 NOTE — TELEPHONE ENCOUNTER
Just a FYI I schedule this pt on 01/16 for htn f/u with Dr Jamison he seen Gracia today for his b/p has been elevated running 132/140 over 100 and sometimes higher please let me if this appt does not work and what wuld so I can schedule him correctly.he is currently not on any meds mom has b/p issues and Gracia told him to continue monitor it.

## 2023-12-22 NOTE — Clinical Note
KELSEY. I saw your patient today for elevated BP without diagnosis hypertension. Suspect 2/2 high sodium intake (Fastfood) + decongestant medications/sinus congestion (since Oct) + sleep apnea. I have him following up with you in 4 weeks. If you feel his visit can be just nurse BP check visit, please have your office change his appointment and notify patient.  - Needs to schedule home sleep study - home sleep study ordered 10/25/23  - Patient will keep BP log and bring to follow up in 4 weeks - only cold medicine approved for HBP - coricidin  - Plan for lifestyle modifications for 3-6 months with goal BP <140/90 -handouts given for diet low sodium

## 2023-12-31 ENCOUNTER — PATIENT MESSAGE (OUTPATIENT)
Dept: FAMILY MEDICINE | Facility: CLINIC | Age: 48
End: 2023-12-31
Payer: COMMERCIAL

## 2024-01-19 ENCOUNTER — TELEPHONE (OUTPATIENT)
Dept: FAMILY MEDICINE | Facility: CLINIC | Age: 49
End: 2024-01-19

## 2024-01-19 ENCOUNTER — OFFICE VISIT (OUTPATIENT)
Dept: FAMILY MEDICINE | Facility: CLINIC | Age: 49
End: 2024-01-19
Payer: COMMERCIAL

## 2024-01-19 ENCOUNTER — LAB VISIT (OUTPATIENT)
Dept: LAB | Facility: HOSPITAL | Age: 49
End: 2024-01-19
Attending: FAMILY MEDICINE
Payer: COMMERCIAL

## 2024-01-19 VITALS
HEART RATE: 62 BPM | OXYGEN SATURATION: 97 % | TEMPERATURE: 98 F | WEIGHT: 197.75 LBS | SYSTOLIC BLOOD PRESSURE: 122 MMHG | DIASTOLIC BLOOD PRESSURE: 96 MMHG | BODY MASS INDEX: 27.69 KG/M2 | HEIGHT: 71 IN

## 2024-01-19 DIAGNOSIS — Z00.00 ROUTINE HEALTH MAINTENANCE: ICD-10-CM

## 2024-01-19 DIAGNOSIS — Z23 NEED FOR SHINGLES VACCINE: ICD-10-CM

## 2024-01-19 DIAGNOSIS — Z23 FLU VACCINE NEED: ICD-10-CM

## 2024-01-19 DIAGNOSIS — G47.30 SLEEP APNEA, UNSPECIFIED TYPE: ICD-10-CM

## 2024-01-19 DIAGNOSIS — Z00.00 ROUTINE HEALTH MAINTENANCE: Primary | ICD-10-CM

## 2024-01-19 LAB
CHOLEST SERPL-MCNC: 196 MG/DL (ref 120–199)
CHOLEST/HDLC SERPL: 3.9 {RATIO} (ref 2–5)
ESTIMATED AVG GLUCOSE: 88 MG/DL (ref 68–131)
HBA1C MFR BLD: 4.7 % (ref 4–5.6)
HDLC SERPL-MCNC: 50 MG/DL (ref 40–75)
HDLC SERPL: 25.5 % (ref 20–50)
LDLC SERPL CALC-MCNC: 132.6 MG/DL (ref 63–159)
NONHDLC SERPL-MCNC: 146 MG/DL
TRIGL SERPL-MCNC: 67 MG/DL (ref 30–150)

## 2024-01-19 PROCEDURE — 36415 COLL VENOUS BLD VENIPUNCTURE: CPT | Mod: PN | Performed by: FAMILY MEDICINE

## 2024-01-19 PROCEDURE — 3044F HG A1C LEVEL LT 7.0%: CPT | Mod: CPTII,S$GLB,, | Performed by: FAMILY MEDICINE

## 2024-01-19 PROCEDURE — 3008F BODY MASS INDEX DOCD: CPT | Mod: CPTII,S$GLB,, | Performed by: FAMILY MEDICINE

## 2024-01-19 PROCEDURE — 1159F MED LIST DOCD IN RCRD: CPT | Mod: CPTII,S$GLB,, | Performed by: FAMILY MEDICINE

## 2024-01-19 PROCEDURE — 3074F SYST BP LT 130 MM HG: CPT | Mod: CPTII,S$GLB,, | Performed by: FAMILY MEDICINE

## 2024-01-19 PROCEDURE — 80061 LIPID PANEL: CPT | Performed by: FAMILY MEDICINE

## 2024-01-19 PROCEDURE — 90472 IMMUNIZATION ADMIN EACH ADD: CPT | Mod: S$GLB,,, | Performed by: FAMILY MEDICINE

## 2024-01-19 PROCEDURE — 83036 HEMOGLOBIN GLYCOSYLATED A1C: CPT | Performed by: FAMILY MEDICINE

## 2024-01-19 PROCEDURE — 3080F DIAST BP >= 90 MM HG: CPT | Mod: CPTII,S$GLB,, | Performed by: FAMILY MEDICINE

## 2024-01-19 PROCEDURE — 90750 HZV VACC RECOMBINANT IM: CPT | Mod: S$GLB,,, | Performed by: FAMILY MEDICINE

## 2024-01-19 PROCEDURE — 90471 IMMUNIZATION ADMIN: CPT | Mod: S$GLB,,, | Performed by: FAMILY MEDICINE

## 2024-01-19 PROCEDURE — 99396 PREV VISIT EST AGE 40-64: CPT | Mod: 25,S$GLB,, | Performed by: FAMILY MEDICINE

## 2024-01-19 PROCEDURE — 90686 IIV4 VACC NO PRSV 0.5 ML IM: CPT | Mod: S$GLB,,, | Performed by: FAMILY MEDICINE

## 2024-01-19 RX ORDER — LEVOCETIRIZINE DIHYDROCHLORIDE 5 MG/1
5 TABLET, FILM COATED ORAL NIGHTLY
Qty: 90 TABLET | Refills: 3 | Status: SHIPPED | OUTPATIENT
Start: 2024-01-19

## 2024-01-19 NOTE — PROGRESS NOTES
" Patient ID: Onofre Sims is a 48 y.o. male.    Chief Complaint: Follow-up    HPI     Onofre Sims is a 48 y.o. male. here for annual exam.  Patient with sleep apnea as well as allergic rhinitis.  Not having any significant complaints here.    Patient with previous prostatitis no complaints today.    Review of Symptoms    Constitutional: Negative.    HENT: Negative.    Eyes: Negative.    Respiratory: Negative.    Cardiovascular: Negative.    Gastrointestinal: Negative.    Endocrine: Negative.    Genitourinary: Negative.    Musculoskeletal: Negative.    Skin: Negative.    Allergic/Immunologic: Negative.    Neurological: Negative.    Hematological: Negative.    Psychiatric/Behavioral: Negative.      Except as above in HPI    Current Outpatient Medications on File Prior to Visit   Medication Sig Dispense Refill    atorvastatin (LIPITOR) 20 MG tablet Take 1 tablet (20 mg total) by mouth once daily. 90 tablet 3    olopatadine (PATANOL) 0.1 % ophthalmic solution INSTILL 1 DROP INTO EACH EYE TWICE DAILY 5 mL 0    [DISCONTINUED] levocetirizine (XYZAL) 5 MG tablet Take 5 mg by mouth every evening.      fluticasone propionate (FLONASE) 50 mcg/actuation nasal spray SMARTSI Spray(s) Both Nares Every Morning      sildenafiL (VIAGRA) 25 MG tablet Take 1 tablet (25 mg total) by mouth daily as needed for Erectile Dysfunction. (Patient not taking: Reported on 2024) 10 tablet 11     No current facility-administered medications on file prior to visit.         Physical  Exam    Vitals:    24 0841   BP: (!) 122/92   BP Location: Left arm   Patient Position: Sitting   Pulse: 62   Temp: 98 °F (36.7 °C)   TempSrc: Oral   SpO2: 97%   Weight: 89.7 kg (197 lb 12 oz)   Height: 5' 11" (1.803 m)          Constitutional:  Oriented to person, place, and time. Appears well-developed and well-nourished.     HENT:   Head: Normocephalic and atraumatic.     Right Ear: External ear normal     Left Ear: External ear normal      Nose: Nose " normal. No rhinorrhea or nasal deformity.     Mouth/Throat: Moist mucus membranes      Eyes: Conjunctivae are normal. Right eye exhibits no discharge. Left eye exhibits no  discharge. No scleral icterus.     Neck:  No JVD present. No tracheal deviation  []  Neck supple.   Carotid Arteries  []  No Bruit    Cardiovascular:  Regular rate and rhythm with normal S1 and S2     Pulmonary/Chest:   Clear to auscultation bilaterally without wheezes, rhonchi or rales    Abdominal: Soft. No distension and no mass.  No tenderness. No rebound and No guarding.     Musculoskeletal: Normal range of motion. No edema or tenderness.   No deformity     Lymphadenopathy:   []  No cervical adenopathy.  []  No inguinal adenopathy    Neurological:  Alert and oriented to person, place, and time. Coordination normal.     Skin: Skin is warm and dry. No rash noted. No bruising     Psychiatric: Normal mood and affect. Speech is normal and behavior is normal. Judgment and thought content normal.       Assessment / Plan:      ICD-10-CM ICD-9-CM   1. Routine health maintenance  Z00.00 V70.0   2. Flu vaccine need  Z23 V04.81   3. Sleep apnea, unspecified type  G47.30 780.57     Routine health maintenance    Flu vaccine need  -     Influenza - Quadrivalent *Preferred* (6 months+) (PF)    Sleep apnea, unspecified type  -     CPAP FOR HOME USE    Other orders  -     levocetirizine (XYZAL) 5 MG tablet; Take 1 tablet (5 mg total) by mouth every evening.  Dispense: 90 tablet; Refill: 3      Needs to continue healthy-BMI 27 perfectly diet and exercise perfect use of CPAP machine when he gets this recently seen by sleep medicine for sleep apnea.    Discussed how to stay healthy             Ramos Terrell M.D.

## 2024-01-19 NOTE — PROGRESS NOTES
Per Dr. Huang he would like bp check to be in 1 month due to pt going to be on cpap. Pt is scheduled for bp check in 1 month

## 2024-01-26 ENCOUNTER — TELEPHONE (OUTPATIENT)
Dept: FAMILY MEDICINE | Facility: CLINIC | Age: 49
End: 2024-01-26
Payer: COMMERCIAL

## 2024-01-26 DIAGNOSIS — G47.33 SLEEP APNEA, OBSTRUCTIVE: Primary | ICD-10-CM

## 2024-01-26 NOTE — TELEPHONE ENCOUNTER
----- Message from Jessica Fuentes sent at 1/26/2024  8:09 AM CST -----  Regarding: AutoPap  Contact: [phone]  Good morning! Please update Rx with a valid Dx of BRAYAN, G47.33. Thanks! Curtis

## 2024-02-01 ENCOUNTER — TELEPHONE (OUTPATIENT)
Dept: FAMILY MEDICINE | Facility: CLINIC | Age: 49
End: 2024-02-01
Payer: COMMERCIAL

## 2024-02-01 DIAGNOSIS — G47.33 SLEEP APNEA, OBSTRUCTIVE: Primary | ICD-10-CM

## 2024-02-01 NOTE — TELEPHONE ENCOUNTER
----- Message from Janesanty Gina sent at 2/1/2024  9:03 AM CST -----  Regarding: AutoPap  Contact: [phone]  Good morning! Please update the pressure for an AutoPap, settings can only be between 5-20cm h2o. Also please include all supplies on Rx, , , , , , & .     Thanks! Curtis         Nurse fax order to [facility]

## 2024-02-08 ENCOUNTER — TELEPHONE (OUTPATIENT)
Dept: FAMILY MEDICINE | Facility: CLINIC | Age: 49
End: 2024-02-08
Payer: COMMERCIAL

## 2024-02-08 DIAGNOSIS — G47.33 SLEEP APNEA, OBSTRUCTIVE: Primary | ICD-10-CM

## 2024-02-25 ENCOUNTER — TELEPHONE (OUTPATIENT)
Dept: FAMILY MEDICINE | Facility: CLINIC | Age: 49
End: 2024-02-25
Payer: COMMERCIAL

## 2024-02-25 DIAGNOSIS — G47.33 SLEEP APNEA, OBSTRUCTIVE: Primary | ICD-10-CM

## 2024-02-26 ENCOUNTER — PATIENT MESSAGE (OUTPATIENT)
Dept: FAMILY MEDICINE | Facility: CLINIC | Age: 49
End: 2024-02-26
Payer: COMMERCIAL

## 2024-03-18 ENCOUNTER — OFFICE VISIT (OUTPATIENT)
Dept: FAMILY MEDICINE | Facility: CLINIC | Age: 49
End: 2024-03-18
Payer: COMMERCIAL

## 2024-03-18 VITALS
SYSTOLIC BLOOD PRESSURE: 132 MMHG | DIASTOLIC BLOOD PRESSURE: 82 MMHG | TEMPERATURE: 98 F | HEIGHT: 71 IN | BODY MASS INDEX: 28.21 KG/M2 | OXYGEN SATURATION: 97 % | WEIGHT: 201.5 LBS | HEART RATE: 79 BPM

## 2024-03-18 DIAGNOSIS — Z00.00 ANNUAL PHYSICAL EXAM: ICD-10-CM

## 2024-03-18 DIAGNOSIS — G47.30 SLEEP APNEA, UNSPECIFIED TYPE: Primary | ICD-10-CM

## 2024-03-18 DIAGNOSIS — R03.0 ELEVATED BLOOD PRESSURE READING WITHOUT DIAGNOSIS OF HYPERTENSION: ICD-10-CM

## 2024-03-18 PROCEDURE — 1160F RVW MEDS BY RX/DR IN RCRD: CPT | Mod: CPTII,S$GLB,, | Performed by: FAMILY MEDICINE

## 2024-03-18 PROCEDURE — 3075F SYST BP GE 130 - 139MM HG: CPT | Mod: CPTII,S$GLB,, | Performed by: FAMILY MEDICINE

## 2024-03-18 PROCEDURE — 1159F MED LIST DOCD IN RCRD: CPT | Mod: CPTII,S$GLB,, | Performed by: FAMILY MEDICINE

## 2024-03-18 PROCEDURE — 3008F BODY MASS INDEX DOCD: CPT | Mod: CPTII,S$GLB,, | Performed by: FAMILY MEDICINE

## 2024-03-18 PROCEDURE — 3044F HG A1C LEVEL LT 7.0%: CPT | Mod: CPTII,S$GLB,, | Performed by: FAMILY MEDICINE

## 2024-03-18 PROCEDURE — 99214 OFFICE O/P EST MOD 30 MIN: CPT | Mod: 25,S$GLB,, | Performed by: FAMILY MEDICINE

## 2024-03-18 PROCEDURE — 3079F DIAST BP 80-89 MM HG: CPT | Mod: CPTII,S$GLB,, | Performed by: FAMILY MEDICINE

## 2024-03-18 PROCEDURE — 90471 IMMUNIZATION ADMIN: CPT | Mod: S$GLB,,, | Performed by: FAMILY MEDICINE

## 2024-03-18 PROCEDURE — 90750 HZV VACC RECOMBINANT IM: CPT | Mod: S$GLB,,, | Performed by: FAMILY MEDICINE

## 2024-03-18 NOTE — PROGRESS NOTES
"Subjective:      Patient ID: Onofre Sims is a 48 y.o. male.    Chief Complaint: Follow-up (On sleep study; brought CPAP machine with him)      Vitals:    24 1445 24 1546   BP: (!) 136/92 132/82   Pulse: 79    Temp: 97.5 °F (36.4 °C)    TempSrc: Oral    SpO2: 97%    Weight: 91.4 kg (201 lb 8 oz)    Height: 5' 11" (1.803 m)         HPI   Follow up use of CPAP for one month; using every night; sleeping longer and more rested  Pt found his cpap report use; using up to 8 hours, missed only one night; helps and feels rested, sleeping well  See media tab for cpap report    Problem List  Patient Active Problem List   Diagnosis    Elevated LFTs    Hyperlipidemia    Lipoma of torso    Keratoconus of both eyes    Urinary frequency    Sleep apnea    Adjustment disorder with mixed anxiety and depressed mood    Nasal sinus congestion    Elevated blood pressure reading without diagnosis of hypertension    Bradycardia        ALLERGIES: Review of patient's allergies indicates:  No Known Allergies    MEDS:   Current Outpatient Medications:     atorvastatin (LIPITOR) 20 MG tablet, Take 1 tablet (20 mg total) by mouth once daily., Disp: 90 tablet, Rfl: 3    fluticasone propionate (FLONASE) 50 mcg/actuation nasal spray, SMARTSI Spray(s) Both Nares Every Morning, Disp: , Rfl:     levocetirizine (XYZAL) 5 MG tablet, Take 1 tablet (5 mg total) by mouth every evening., Disp: 90 tablet, Rfl: 3    olopatadine (PATANOL) 0.1 % ophthalmic solution, INSTILL 1 DROP INTO EACH EYE TWICE DAILY, Disp: 5 mL, Rfl: 0    sildenafiL (VIAGRA) 25 MG tablet, Take 1 tablet (25 mg total) by mouth daily as needed for Erectile Dysfunction., Disp: 10 tablet, Rfl: 11      History:  Current Providers as of 3/18/2024  PCP: Theodore Jamison MD  Care Team Provider: Fiordaliza Alvarado LPN  Care Team Provider: Venus Manning LPN  Care Team Provider: RADHA Butts Jr., OD  Care Team Provider: Orlando Hernandez MD  Care Team Provider: Peg Newman, " NP  Care Team Provider: Lucila Yates, PhD  Care Team Provider: Levi Gtz MD  Care Team Provider: Rodriguez Rojas MD  Care Team Provider: Carolina Suero MD  Encounter Provider: Theodore Jamison MD, starting on Mon Mar 18, 2024 12:00 AM  Referring Provider: not found, starting on Mon Mar 18, 2024 12:00 AM  Consulting Physician: Theodore Jamison MD, starting on Mon Mar 18, 2024  2:43 PM (Active)   Past Medical History:   Diagnosis Date    Anxiety     Depression     Hyperlipidemia     Keratoconus      Past Surgical History:   Procedure Laterality Date    BREAST SURGERY      [place], enlargement, not cancer    COLONOSCOPY N/A 11/1/2023    Procedure: COLONOSCOPY;  Surgeon: Keith Rodriguez MD;  Location: [facility];  Service: Endoscopy;  Laterality: N/A;     Social History     Tobacco Use    Smoking status: Never     Passive exposure: Never    Smokeless tobacco: Never   Substance Use Topics    Alcohol use: Yes     Comment: socially    Drug use: No         Review of Systems   Constitutional:  Negative for appetite change, fatigue, fever and unexpected weight change.   HENT:  Negative for congestion, ear pain, sinus pressure and sore throat.    Eyes:  Negative for pain and visual disturbance.   Respiratory:  Negative for shortness of breath.    Cardiovascular:  Negative for chest pain.   Gastrointestinal:  Negative for abdominal pain, constipation and diarrhea.   Endocrine: Negative for polyuria.   Genitourinary:  Negative for difficulty urinating and frequency.   Musculoskeletal:  Negative for arthralgias, back pain and myalgias.   Skin:  Negative for color change.   Allergic/Immunologic: Negative.    Neurological:  Negative for syncope, weakness and headaches.   Hematological:  Does not bruise/bleed easily.   Psychiatric/Behavioral:  Negative for dysphoric mood and suicidal ideas. The patient is not nervous/anxious.    All other systems reviewed and are negative.    Objective:     Physical  Exam  Vitals and nursing note reviewed.   Constitutional:       General: He is not in acute distress.     Appearance: He is well-developed. He is not diaphoretic.   HENT:      Head: Normocephalic and atraumatic.      Right Ear: External ear normal.      Left Ear: External ear normal.      Mouth/Throat:      Pharynx: No oropharyngeal exudate.   Eyes:      General: No scleral icterus.        Right eye: No discharge.         Left eye: No discharge.      Conjunctiva/sclera: Conjunctivae normal.      Pupils: Pupils are equal, round, and reactive to light.   Neck:      Thyroid: No thyromegaly.      Vascular: No JVD.      Trachea: No tracheal deviation.   Cardiovascular:      Rate and Rhythm: Normal rate and regular rhythm.      Heart sounds: No murmur heard.     No friction rub. No gallop.   Pulmonary:      Effort: Pulmonary effort is normal. No respiratory distress.      Breath sounds: Normal breath sounds. No stridor. No wheezing or rales.   Chest:      Chest wall: No tenderness.   Abdominal:      General: There is no distension.      Palpations: Abdomen is soft. There is no mass.      Tenderness: There is no abdominal tenderness. There is no guarding or rebound.   Musculoskeletal:         General: No tenderness. Normal range of motion.      Cervical back: Normal range of motion and neck supple.   Lymphadenopathy:      Cervical: No cervical adenopathy.   Skin:     General: Skin is warm and dry.      Coloration: Skin is not pale.      Findings: No erythema or rash.   Neurological:      General: No focal deficit present.      Mental Status: He is alert and oriented to person, place, and time. Mental status is at baseline.      Cranial Nerves: No cranial nerve deficit.      Motor: No abnormal muscle tone.      Coordination: Coordination normal.      Deep Tendon Reflexes: Reflexes are normal and symmetric. Reflexes normal.   Psychiatric:         Mood and Affect: Mood normal.         Behavior: Behavior normal.          Thought Content: Thought content normal.         Judgment: Judgment normal.             Assessment:     1. Sleep apnea, unspecified type    2. Elevated blood pressure reading without diagnosis of hypertension    3. Annual physical exam      Plan:        Medication List            Accurate as of March 18, 2024 11:59 PM. If you have any questions, ask your nurse or doctor.                CONTINUE taking these medications      atorvastatin 20 MG tablet  Commonly known as: LIPITOR  Take 1 tablet (20 mg total) by mouth once daily.     fluticasone propionate 50 mcg/actuation nasal spray  Commonly known as: FLONASE     levocetirizine 5 MG tablet  Commonly known as: XYZAL  Take 1 tablet (5 mg total) by mouth every evening.     olopatadine 0.1 % ophthalmic solution  Commonly known as: PATANOL  INSTILL 1 DROP INTO EACH EYE TWICE DAILY     sildenafiL 25 MG tablet  Commonly known as: VIAGRA  Take 1 tablet (25 mg total) by mouth daily as needed for Erectile Dysfunction.            Sleep apnea, unspecified type    Elevated blood pressure reading without diagnosis of hypertension    Annual physical exam  -     PSA, Screening; Future    Other orders  -     Zoster Recombinant Vaccine

## 2024-04-23 ENCOUNTER — LAB VISIT (OUTPATIENT)
Dept: LAB | Facility: HOSPITAL | Age: 49
End: 2024-04-23
Attending: FAMILY MEDICINE
Payer: COMMERCIAL

## 2024-04-23 DIAGNOSIS — Z00.00 ANNUAL PHYSICAL EXAM: ICD-10-CM

## 2024-04-23 LAB — COMPLEXED PSA SERPL-MCNC: 0.11 NG/ML (ref 0–4)

## 2024-04-23 PROCEDURE — 36415 COLL VENOUS BLD VENIPUNCTURE: CPT | Mod: PN | Performed by: FAMILY MEDICINE

## 2024-04-23 PROCEDURE — 84153 ASSAY OF PSA TOTAL: CPT | Performed by: FAMILY MEDICINE

## 2024-05-23 ENCOUNTER — PATIENT MESSAGE (OUTPATIENT)
Dept: FAMILY MEDICINE | Facility: CLINIC | Age: 49
End: 2024-05-23
Payer: COMMERCIAL

## 2024-05-23 DIAGNOSIS — E78.5 HYPERLIPIDEMIA, UNSPECIFIED HYPERLIPIDEMIA TYPE: ICD-10-CM

## 2024-05-23 RX ORDER — ATORVASTATIN CALCIUM 20 MG/1
20 TABLET, FILM COATED ORAL DAILY
Qty: 90 TABLET | Refills: 3 | Status: SHIPPED | OUTPATIENT
Start: 2024-05-23

## 2024-05-25 DIAGNOSIS — E78.5 HYPERLIPIDEMIA, UNSPECIFIED HYPERLIPIDEMIA TYPE: ICD-10-CM

## 2024-05-25 NOTE — TELEPHONE ENCOUNTER
Care Due:                  Date            Visit Type   Department     Provider  --------------------------------------------------------------------------------                                MYCHART                              FOLLOWUP/OF  [facility]  Last Visit: 03-      FICE VISIT   MEDICINE       Theodore NOYOLA -                              PRIMARY      [facility]  Next Visit: 07-      CARE ([facility])   MEDICINE       Theodore Jamison                                                            Last  Test          Frequency    Reason                     Performed    Due Date  --------------------------------------------------------------------------------    CMP.........  12 months..  atorvastatin.............  08- 08-    [facility] Embedded Care Due Messages. Reference number: [id number].   5/25/2024 12:08:36 PM CDT

## 2024-05-26 RX ORDER — ATORVASTATIN CALCIUM 20 MG/1
20 TABLET, FILM COATED ORAL
Qty: 90 TABLET | Refills: 3 | OUTPATIENT
Start: 2024-05-26

## 2024-05-26 NOTE — TELEPHONE ENCOUNTER
Provider Staff:  Action required for this patient     Please see care gap opportunities below in Care Due Message.    Thanks!  [facility]     Appointments      Date Provider   Last Visit   3/18/2024 Theodore Jamison MD   Next Visit   7/11/2024 Theodore Jamison MD      Refill Decision Note   Onofre Sims  is requesting a refill authorization.  Brief Assessment and Rationale for Refill:  Quick Discontinue     Medication Therapy Plan:  : Receipt confirmed by pharmacy (5/23/2024  2:53 PM CDT)      Comments:     Note composed:3:28 AM 05/26/2024

## 2024-06-25 LAB
CHOLEST SERPL-MSCNC: 196 MG/DL (ref 0–200)
HDLC SERPL-MCNC: 47 MG/DL (ref 35–70)
LDLC SERPL CALC-MCNC: 127 MG/DL (ref 0–160)
TRIGL SERPL-MCNC: 111 MG/DL (ref 40–160)

## 2024-06-27 ENCOUNTER — PATIENT OUTREACH (OUTPATIENT)
Dept: ADMINISTRATIVE | Facility: HOSPITAL | Age: 49
End: 2024-06-27
Payer: COMMERCIAL

## 2024-06-27 NOTE — PROGRESS NOTES
Population Health Chart Review & Patient Outreach Details      Additional Pop Health Notes:      Lipid panel updated in          Updates Requested / Reviewed:      Other    [facility]         Health Maintenance Topics Overdue:      VBHM Score: 0     Patient is not due for any topics at this time.                       Health Maintenance Topic(s) Outreach Outcomes & Actions Taken:    Lab(s) - Outreach Outcomes & Actions Taken  : External Records Uploaded & Care Team Updated if Applicable

## 2024-08-13 ENCOUNTER — PATIENT MESSAGE (OUTPATIENT)
Dept: FAMILY MEDICINE | Facility: CLINIC | Age: 49
End: 2024-08-13
Payer: COMMERCIAL

## 2024-08-13 RX ORDER — LEVOCETIRIZINE DIHYDROCHLORIDE 5 MG/1
5 TABLET, FILM COATED ORAL NIGHTLY
Qty: 90 TABLET | Refills: 2 | Status: SHIPPED | OUTPATIENT
Start: 2024-08-13

## 2024-08-13 NOTE — TELEPHONE ENCOUNTER
Refill Routing Note   Medication(s) are not appropriate for processing by [facility] for the following reason(s):        No active prescription written by provider    [facility] action(s):  Defer   Requires labs : Yes             Appointments  past 12m or future 3m with PCP    Date Provider   Last Visit   3/18/2024 Theodore Jamison MD   Next Visit   Visit date not found Theodore Jamison MD   ED visits in past 90 days: 0        Note composed:2:06 PM 08/13/2024

## 2024-08-13 NOTE — TELEPHONE ENCOUNTER
Care Due:                  Date            Visit Type   Department     Provider  --------------------------------------------------------------------------------                                MYCHART                              FOLLOWUP/OF  [facility]  Last Visit: 03-      FICE VISIT   MEDICINE       Theodore Jamison  Next Visit: None Scheduled  None         None Found                                                            Last  Test          Frequency    Reason                     Performed    Due Date  --------------------------------------------------------------------------------    CMP.........  12 months..  atorvastatin.............  08- 08-    [facility] Embedded Care Due Messages. Reference number: [id number].   8/13/2024 10:33:13 AM CDT

## 2024-08-23 ENCOUNTER — TELEPHONE (OUTPATIENT)
Dept: PHARMACY | Facility: CLINIC | Age: 49
End: 2024-08-23
Payer: COMMERCIAL

## 2024-08-23 NOTE — TELEPHONE ENCOUNTER
[facility]/Population Health Chart Review & Patient Outreach Details For Medication Adherence Project    Reason for Outreach Encounter: 3rd Party payor non-compliance report ([employer], [employer], C, etc)  2.  Patient Outreach Method: Reviewed Patient Chart  3.   Medication in question: atorvastatin 20 mg    LAST FILLED: 5/24/24 for 90 day supply  Hyperlipidemia Medications               atorvastatin (LIPITOR) 20 MG tablet Take 1 tablet (20 mg total) by mouth once daily.               4.  Reviewed and or Updates Made To: Patient Chart  5. Outreach Outcomes and/or actions taken: Patient filled medication and is on track to be adherent

## 2024-09-16 ENCOUNTER — TELEPHONE (OUTPATIENT)
Dept: PHARMACY | Facility: CLINIC | Age: 49
End: 2024-09-16
Payer: COMMERCIAL

## 2024-09-16 NOTE — TELEPHONE ENCOUNTER
[facility]/Population Health Chart Review & Patient Outreach Details For Medication Adherence Project    Reason for Outreach Encounter: 3rd Party payor non-compliance report ([employer], [employer], C, etc)  2.  Patient Outreach Method: Reviewed patient chart  and [employer] message  3.   Medication in question:   Hyperlipidemia Medications               atorvastatin (LIPITOR) 20 MG tablet Take 1 tablet (20 mg total) by mouth once daily.                LAST FILLED: 5/24/24 for 90 day supply  4.  Reviewed and or Updates Made To: Patient Chart  5. Outreach Outcomes and/or actions taken: Sent inquiry to patient: Waiting for response  Additional Notes:

## 2025-02-08 NOTE — TELEPHONE ENCOUNTER
Attempted to contact Onofre Sims to schedule procedure , no answer, left message with call back number    You were seen in the ED for your left knee pain.  Physical exam was reassuring that nothing was significantly broken that required emergent surgery, or dislocated that required reduction.  You are prescribed a muscle relaxant for your muscle spasms.    Please alternate between Tylenol and ibuprofen for your pain every 4 hours.  Take 1 g of Tylenol and 4 hours later take 600 mg of ibuprofen.  Do not exceed more than 4 g of Tylenol or 2400 mg of ibuprofen in a day.  You can also take baths with Epsom salt for the pain, use ice or heating pads for the pain as well.  Take Flexeril for breakthrough pain.  Pain medication can sedate you, do not drive or operate heavy machinery while taking them.  You can also purchase lidocaine patches, you can cut them down the middle and then wrap them around the knee.  Change the lidocaine patches out every 12 hours.    You are given a referral to the orthopedic walk-in clinics, you make an appointment with them and follow-up if your knee pain persists, and you are unable to bend the knee a week later from today.     Please return to the ED if you have a fever greater than 101 °F for more than 24 hours that cannot be reduced with Tylenol or Motrin, if your feet turn different colors, if you have no pulses in the foot, if you develop paralysis, and if you have any other concerns.

## 2025-06-18 ENCOUNTER — TELEPHONE (OUTPATIENT)
Dept: FAMILY MEDICINE | Facility: CLINIC | Age: 50
End: 2025-06-18

## 2025-06-18 ENCOUNTER — OFFICE VISIT (OUTPATIENT)
Dept: FAMILY MEDICINE | Facility: CLINIC | Age: 50
End: 2025-06-18
Payer: COMMERCIAL

## 2025-06-18 VITALS
HEART RATE: 75 BPM | TEMPERATURE: 98 F | WEIGHT: 203.5 LBS | OXYGEN SATURATION: 96 % | HEIGHT: 71 IN | SYSTOLIC BLOOD PRESSURE: 126 MMHG | DIASTOLIC BLOOD PRESSURE: 82 MMHG | BODY MASS INDEX: 28.49 KG/M2

## 2025-06-18 DIAGNOSIS — G47.33 OBSTRUCTIVE SLEEP APNEA SYNDROME: ICD-10-CM

## 2025-06-18 DIAGNOSIS — E78.5 HYPERLIPIDEMIA, UNSPECIFIED HYPERLIPIDEMIA TYPE: ICD-10-CM

## 2025-06-18 DIAGNOSIS — Z00.00 ROUTINE HEALTH MAINTENANCE: Primary | ICD-10-CM

## 2025-06-18 DIAGNOSIS — R35.0 URINARY FREQUENCY: ICD-10-CM

## 2025-06-18 PROCEDURE — 99214 OFFICE O/P EST MOD 30 MIN: CPT | Mod: S$GLB,,, | Performed by: FAMILY MEDICINE

## 2025-06-18 PROCEDURE — 3008F BODY MASS INDEX DOCD: CPT | Mod: CPTII,S$GLB,, | Performed by: FAMILY MEDICINE

## 2025-06-18 PROCEDURE — 3079F DIAST BP 80-89 MM HG: CPT | Mod: CPTII,S$GLB,, | Performed by: FAMILY MEDICINE

## 2025-06-18 PROCEDURE — 1159F MED LIST DOCD IN RCRD: CPT | Mod: CPTII,S$GLB,, | Performed by: FAMILY MEDICINE

## 2025-06-18 PROCEDURE — 1160F RVW MEDS BY RX/DR IN RCRD: CPT | Mod: CPTII,S$GLB,, | Performed by: FAMILY MEDICINE

## 2025-06-18 PROCEDURE — 3074F SYST BP LT 130 MM HG: CPT | Mod: CPTII,S$GLB,, | Performed by: FAMILY MEDICINE

## 2025-06-18 RX ORDER — ATORVASTATIN CALCIUM 20 MG/1
20 TABLET, FILM COATED ORAL DAILY
Qty: 90 TABLET | Refills: 3 | Status: SHIPPED | OUTPATIENT
Start: 2025-06-18

## 2025-06-18 RX ORDER — LEVOCETIRIZINE DIHYDROCHLORIDE 5 MG/1
5 TABLET, FILM COATED ORAL NIGHTLY
Qty: 90 TABLET | Refills: 2 | Status: SHIPPED | OUTPATIENT
Start: 2025-06-18

## 2025-06-18 NOTE — TELEPHONE ENCOUNTER
Copied from CRM #[id number]. Topic: General Inquiry - Patient Advice  >> Jun 18, 2025  3:35 PM Amanda wrote:  .Type:  Needs Medical Advice    Who Called: pt    Would the patient rather a call back or a response via [facility]? Call back  Best Call Back Number: [phone]  Additional Information:     Pt stated he will be about 15 minutes late

## 2025-06-19 ENCOUNTER — LAB VISIT (OUTPATIENT)
Dept: LAB | Facility: HOSPITAL | Age: 50
End: 2025-06-19
Attending: FAMILY MEDICINE
Payer: COMMERCIAL

## 2025-06-19 ENCOUNTER — TELEPHONE (OUTPATIENT)
Dept: FAMILY MEDICINE | Facility: CLINIC | Age: 50
End: 2025-06-19

## 2025-06-19 DIAGNOSIS — R35.0 URINARY FREQUENCY: ICD-10-CM

## 2025-06-19 DIAGNOSIS — Z00.00 ROUTINE HEALTH MAINTENANCE: ICD-10-CM

## 2025-06-19 DIAGNOSIS — E78.5 HYPERLIPIDEMIA, UNSPECIFIED HYPERLIPIDEMIA TYPE: ICD-10-CM

## 2025-06-19 LAB
ABSOLUTE EOSINOPHIL (OHS): 0.2 K/UL
ABSOLUTE MONOCYTE (OHS): 0.41 K/UL (ref 0.3–1)
ABSOLUTE NEUTROPHIL COUNT (OHS): 2.98 K/UL (ref 1.8–7.7)
ALBUMIN SERPL BCP-MCNC: 4.2 G/DL (ref 3.5–5.2)
ALP SERPL-CCNC: 61 UNIT/L (ref 38–126)
ALT SERPL W/O P-5'-P-CCNC: 42 UNIT/L (ref 10–44)
ANION GAP (OHS): 7 MMOL/L (ref 8–16)
AST SERPL-CCNC: 34 UNIT/L (ref 15–46)
BASOPHILS # BLD AUTO: 0.05 K/UL
BASOPHILS NFR BLD AUTO: 1 %
BILIRUB SERPL-MCNC: 0.8 MG/DL (ref 0.1–1)
BILIRUB UR QL STRIP.AUTO: NEGATIVE
BUN SERPL-MCNC: 19 MG/DL (ref 2–20)
CALCIUM SERPL-MCNC: 8.9 MG/DL (ref 8.7–10.5)
CHLORIDE SERPL-SCNC: 102 MMOL/L (ref 95–110)
CHOLEST SERPL-MCNC: 244 MG/DL (ref 120–199)
CHOLEST/HDLC SERPL: 5.3 {RATIO} (ref 2–5)
CLARITY UR: CLEAR
CO2 SERPL-SCNC: 30 MMOL/L (ref 23–29)
COLOR UR AUTO: YELLOW
CREAT SERPL-MCNC: 1.3 MG/DL (ref 0.5–1.4)
ERYTHROCYTE [DISTWIDTH] IN BLOOD BY AUTOMATED COUNT: 11.3 % (ref 11.5–14.5)
GFR SERPLBLD CREATININE-BSD FMLA CKD-EPI: >60 ML/MIN/1.73/M2
GLUCOSE SERPL-MCNC: 92 MG/DL (ref 70–110)
GLUCOSE UR QL STRIP: NEGATIVE
HCT VFR BLD AUTO: 38.9 % (ref 40–54)
HDLC SERPL-MCNC: 46 MG/DL (ref 40–75)
HDLC SERPL: 18.9 % (ref 20–50)
HGB BLD-MCNC: 13.1 GM/DL (ref 14–18)
HGB UR QL STRIP: ABNORMAL
IMM GRANULOCYTES # BLD AUTO: 0.01 K/UL (ref 0–0.04)
IMM GRANULOCYTES NFR BLD AUTO: 0.2 % (ref 0–0.5)
KETONES UR QL STRIP: NEGATIVE
LDLC SERPL CALC-MCNC: 182.6 MG/DL (ref 63–159)
LEUKOCYTE ESTERASE UR QL STRIP: NEGATIVE
LYMPHOCYTES # BLD AUTO: 1.45 K/UL (ref 1–4.8)
MCH RBC QN AUTO: 33.4 PG (ref 27–31)
MCHC RBC AUTO-ENTMCNC: 33.7 G/DL (ref 32–36)
MCV RBC AUTO: 99 FL (ref 82–98)
NITRITE UR QL STRIP: NEGATIVE
NONHDLC SERPL-MCNC: 198 MG/DL
NUCLEATED RBC (/100WBC) (OHS): 0 /100 WBC
PH UR STRIP: 6 [PH]
PLATELET # BLD AUTO: 253 K/UL (ref 150–450)
PMV BLD AUTO: 10 FL (ref 9.2–12.9)
POTASSIUM SERPL-SCNC: 4.7 MMOL/L (ref 3.5–5.1)
PROT SERPL-MCNC: 8.1 GM/DL (ref 6–8.4)
PROT UR QL STRIP: NEGATIVE
RBC # BLD AUTO: 3.92 M/UL (ref 4.6–6.2)
RELATIVE EOSINOPHIL (OHS): 3.9 %
RELATIVE LYMPHOCYTE (OHS): 28.4 % (ref 18–48)
RELATIVE MONOCYTE (OHS): 8 % (ref 4–15)
RELATIVE NEUTROPHIL (OHS): 58.5 % (ref 38–73)
SODIUM SERPL-SCNC: 139 MMOL/L (ref 136–145)
SP GR UR STRIP: >=1.03
TRIGL SERPL-MCNC: 77 MG/DL (ref 30–150)
TSH SERPL-ACNC: 3.47 UIU/ML (ref 0.4–4)
UROBILINOGEN UR STRIP-ACNC: NEGATIVE EU/DL
WBC # BLD AUTO: 5.1 K/UL (ref 3.9–12.7)

## 2025-06-19 PROCEDURE — 82565 ASSAY OF CREATININE: CPT | Mod: PN

## 2025-06-19 PROCEDURE — 81003 URINALYSIS AUTO W/O SCOPE: CPT | Mod: PN

## 2025-06-19 PROCEDURE — 36415 COLL VENOUS BLD VENIPUNCTURE: CPT | Mod: PN

## 2025-06-19 PROCEDURE — 84443 ASSAY THYROID STIM HORMONE: CPT | Mod: PN

## 2025-06-19 PROCEDURE — 82465 ASSAY BLD/SERUM CHOLESTEROL: CPT | Mod: PN

## 2025-06-19 PROCEDURE — 85025 COMPLETE CBC W/AUTO DIFF WBC: CPT | Mod: PN

## 2025-06-19 NOTE — TELEPHONE ENCOUNTER
Copied from CRM #[id number]. Topic: General Inquiry - Patient Advice  >> Jun 19, 2025  9:58 AM Paige wrote:  Type:  Pt Advice     Who Called: Pt   Does the patient know what this is regarding?: pt has questions regarding his lab doen this morning   Would the patient rather a call back or a response via [facility]? Call / Portal   Best Call Back Number:[phone]   Additional Information:

## 2025-06-19 NOTE — TELEPHONE ENCOUNTER
I spoke with pt and informed him that all results have not resulted yet and that we will contact him regarding results once provider has an opportunity to review labs.

## 2025-06-20 ENCOUNTER — TELEPHONE (OUTPATIENT)
Dept: FAMILY MEDICINE | Facility: CLINIC | Age: 50
End: 2025-06-20
Payer: COMMERCIAL

## 2025-06-20 NOTE — TELEPHONE ENCOUNTER
Copied from CRM #[id number]. Topic: General Inquiry - Test Results  >> Jun 20, 2025 10:22 AM Paige wrote:  Type:  Test Results    Who Called: Pt   Name of Test (Lab/Mammo/Etc): fasting lab   Date of Test: 06/19  Ordering Provider:  st aiken   Where the test was performed: [facility]  Would the patient rather a call back or a response via [facility]? Call / portal  Best Call Back Number: [phone]   Additional Information:

## 2025-06-20 NOTE — TELEPHONE ENCOUNTER
Pt has been notified and verbalized understanding that results have not yet been reviewed, but will be reviewed tonight or tomorrow. Pt understands that he will receive a message on [facility] portal.

## 2025-06-20 NOTE — TELEPHONE ENCOUNTER
I have not reviewed the results-I will review them tonight or tomorrow.  I will send results through the portal.

## 2025-06-22 ENCOUNTER — RESULTS FOLLOW-UP (OUTPATIENT)
Dept: FAMILY MEDICINE | Facility: CLINIC | Age: 50
End: 2025-06-22

## 2025-06-23 ENCOUNTER — TELEPHONE (OUTPATIENT)
Dept: FAMILY MEDICINE | Facility: CLINIC | Age: 50
End: 2025-06-23
Payer: COMMERCIAL

## 2025-06-23 NOTE — PROGRESS NOTES
" Patient ID: Onofre Sims is a 49 y.o. male.    Chief Complaint: Annual Exam    HPI       Onofre Sims is a 49 y.o. male here for annual wellness exam.  Follow-up for lipidemia in allergic rhinitis.  Intermittent allergic rhinitis exacerbations otherwise doing quite well.              Review of Symptoms        Physical Exam    Vitals:    06/18/25 1606 06/18/25 1648   BP: (!) 140/92 126/82   BP Location: Left arm    Patient Position: Sitting    Pulse: 75    Temp: 98.2 °F (36.8 °C)    TempSrc: Oral    SpO2: 96%    Weight: 92.3 kg (203 lb 7.8 oz)    Height: 5' 11" (1.803 m)         Constitutional:   Oriented to person, place, and time.appears well-developed and well-nourished.   No distress.     HENT:   Head: Normocephalic and atraumatic.     Right Ear: Tympanic membrane, external ear and ear canal normal     Left Ear: Tympanic membrane, external ear and ear canal normal    Nose: External Normal. Normal turbinates, No rhinorrhea     Mouth/Throat: Uvula is midline, oropharynx is clear and moist and mucous membranes are normal.     Neck: supple no anterior cervical adenopathy    Carotid artery:  Bruit    NEG []   POS[]    Eyes:   Conjunctivae are normal. Right eye exhibits no discharge. Left eye exhibits no discharge. No scleral icterus. No periorbital edema     Cardiovascular:  Regular rate and rhythm with normal S1 and S2     Pulmonary/Chest:   Clear to auscultation bilaterally without wheezes, rhonchi or rales    Musculoskeletal:  No edema. No obvious deformity No wasting     Neurological:   Alert and oriented to person, place, and time. Coordination normal.     Skin:   Skin is warm and dry.   No diaphoresis.   No rash noted.    Psychiatric: Normal mood and affect. Behavior is normal. Judgment and thought content normal.     Physical Exam              Assessment / Plan:      ICD-10-CM ICD-9-CM   1. Routine health maintenance  Z00.00 V70.0   2. Obstructive sleep apnea syndrome  G47.33 327.23   3. Hyperlipidemia, " unspecified hyperlipidemia type  E78.5 272.4   4. Urinary frequency  R35.0 788.41     Routine health maintenance  -     Comprehensive Metabolic Panel; Future  -     CBC Auto Differential; Future  -     Lipid Panel; Future  -     TSH; Future  -     Urinalysis; Future    Obstructive sleep apnea syndrome    Hyperlipidemia, unspecified hyperlipidemia type  -     Comprehensive Metabolic Panel; Future  -     CBC Auto Differential; Future  -     Lipid Panel; Future  -     TSH; Future  -     Urinalysis; Future  -     atorvastatin (LIPITOR) 20 MG tablet; Take 1 tablet (20 mg total) by mouth once daily.  Dispense: 90 tablet; Refill: 3    Urinary frequency  -     Comprehensive Metabolic Panel; Future  -     CBC Auto Differential; Future  -     Lipid Panel; Future  -     TSH; Future  -     Urinalysis; Future    Other orders  -     levocetirizine (XYZAL) 5 MG tablet; Take 1 tablet (5 mg total) by mouth every evening.  Dispense: 90 tablet; Refill: 2      Continue current medications for cholesterol allergic Rhinitis and UA for hx of urinary symptoms.

## 2025-06-25 ENCOUNTER — TELEPHONE (OUTPATIENT)
Dept: FAMILY MEDICINE | Facility: CLINIC | Age: 50
End: 2025-06-25
Payer: COMMERCIAL

## 2025-06-25 DIAGNOSIS — R35.0 URINARY FREQUENCY: ICD-10-CM

## 2025-06-25 DIAGNOSIS — Z12.5 SCREENING PSA (PROSTATE SPECIFIC ANTIGEN): Primary | ICD-10-CM

## 2025-06-25 NOTE — TELEPHONE ENCOUNTER
Pt states he was not taking the atorvastatin. Pt states he has started taking it since his visit on 6/18/25.    Pt is asking to have a PSA test drawn.    Pt also feels that he may have a UTI, pt is urinating frequently and does not feel that the bladder is being emptied completely

## 2025-06-25 NOTE — TELEPHONE ENCOUNTER
----- Message from Ramos Huang MD sent at 6/22/2025  2:45 PM CDT -----  Your cholesterol levels are little elevated from previous.  Are you taking your cholesterol-lowering medication atorvastatin?  ----- Message -----  From: Lab, Background User  Sent: 6/19/2025   9:25 AM CDT  To: Ramos Huang MD

## 2025-06-26 NOTE — TELEPHONE ENCOUNTER
"Source   Onofre Sims" (Patient)    Subject   Onofre Sims" (Patient)    Topic   General Inquiry - Return Call      Summary   Return Call   Communication   Type:  Patient Returning Call            Who Called:pt      Who Left Message for Patient:Kathleen      Does the patient know what this is regarding?:no      Would the patient rather a call back or a response via [facility]? Call back      Best Call Back Number:[phone]      Additional Information:     "

## 2025-06-26 NOTE — TELEPHONE ENCOUNTER
Sent in lab work for PSA test.  Also ordered urinalysis    Secondly, you has been diagnosed with an overactive bladder.  Your feeling of not being able to empty completely is probably due to your bladder-can I send you for a referral to the Urology?

## 2025-07-07 ENCOUNTER — HOSPITAL ENCOUNTER (EMERGENCY)
Facility: HOSPITAL | Age: 50
Discharge: HOME | End: 2025-07-07
Attending: STUDENT IN AN ORGANIZED HEALTH CARE EDUCATION/TRAINING PROGRAM
Payer: COMMERCIAL

## 2025-07-07 VITALS
TEMPERATURE: 98 F | HEART RATE: 65 BPM | SYSTOLIC BLOOD PRESSURE: 128 MMHG | RESPIRATION RATE: 16 BRPM | WEIGHT: 200 LBS | BODY MASS INDEX: 28 KG/M2 | HEIGHT: 71 IN | DIASTOLIC BLOOD PRESSURE: 70 MMHG | OXYGEN SATURATION: 98 %

## 2025-07-07 DIAGNOSIS — M62.838 NECK MUSCLE SPASM: Primary | ICD-10-CM

## 2025-07-07 PROCEDURE — 99281 EMR DPT VST MAYX REQ PHY/QHP: CPT

## 2025-07-07 NOTE — FIRST PROVIDER EVALUATION
"Medical screening examination initiated.  I have conducted a focused provider triage encounter, findings are as follows:    Brief history of present illness:  48 yo M c/o pulsation to L side of neck. No numbness or weakness    Vitals:    07/07/25 1811   BP: 136/79   Pulse: 68   Resp: 18   Temp: 98.2 °F (36.8 °C)   TempSrc: Oral   SpO2: 98%   Weight: 90.7 kg (200 lb)   Height: 5' 11" (1.803 m)       Pertinent physical exam:  nontoxic, well appearing    Brief workup plan:  re-evaluation in room    Preliminary workup initiated; this workup will be continued and followed by the physician or advanced practice provider that is assigned to the patient when roomed.  "

## 2025-07-08 NOTE — ED PROVIDER NOTES
Encounter Date: 7/7/2025       History     Chief Complaint   Patient presents with    Multiple complaints     Feels like pulsating to L side of neck 2 d ago ,      49-year-old male with PMH of hypertension presents with left-sided neck spasm.  Patient states that 2 days ago he saw something twitching/pulsating on the left side of his neck.  He denies having any other associated symptoms, including headache, dizziness, lightheadedness, pain, shortness of breath, numbness/tingling of the extremities, muscle weakness.  Denies history of similar symptoms.  He is not sure what causes it to happen.  He went to an urgent Care who said they did not have any answers for him so he came to the ED for evaluation.    The history is provided by the patient.     Review of patient's allergies indicates:  No Known Allergies  Past Medical History:   Diagnosis Date    Anxiety     Depression     Hyperlipidemia     Keratoconus      Past Surgical History:   Procedure Laterality Date    BREAST SURGERY      Mc [place], joana, not cancer    COLONOSCOPY N/A 11/1/2023    Procedure: COLONOSCOPY;  Surgeon: Keith Rodriguez MD;  Location: [facility];  Service: Endoscopy;  Laterality: N/A;     Family History   Problem Relation Name Age of Onset    Depression Mother      Depression Father      Cancer Maternal Grandmother          breast    Diabetes Maternal Grandmother      Hypertension Maternal Grandmother      Cancer Paternal Grandmother          ovarian    No Known Problems Brother      No Known Problems Maternal Aunt      No Known Problems Maternal Uncle      No Known Problems Paternal Aunt      No Known Problems Paternal Uncle      No Known Problems Maternal Grandfather      No Known Problems Paternal Grandfather      No Known Problems Daughter      No Known Problems Brother      Amblyopia Neg Hx      Blindness Neg Hx      Cataracts Neg Hx      Glaucoma Neg Hx      Macular degeneration Neg Hx      Retinal detachment Neg Hx      Strabismus  Neg Hx      Stroke Neg Hx      Thyroid disease Neg Hx       Social History[1]  Review of Systems    Physical Exam     Initial Vitals [07/07/25 1811]   BP Pulse Resp Temp SpO2   136/79 68 18 98.2 °F (36.8 °C) 98 %      MAP       --         Physical Exam    Vitals reviewed.  Constitutional: Vital signs are normal. He appears well-developed and well-nourished. No distress.   HENT:   Head: Normocephalic and atraumatic.   Cardiovascular:  Normal rate and intact distal pulses.           Pulmonary/Chest: No respiratory distress.   No increased work of breathing or tachypnea     Neurological: He is alert and oriented to person, place, and time. He has normal strength. No sensory deficit. GCS score is 15. GCS eye subscore is 4. GCS verbal subscore is 5. GCS motor subscore is 6.         ED Course   Procedures  Labs Reviewed - No data to display       Imaging Results    None          Medications - No data to display  Medical Decision Making  Differential diagnoses considered includes muscle spasm/twitching, vasospasm, doubt DVT    Pt didn't have any observable symptoms and has had no symptoms other than the twitching. I believe he is stable for outpatient management with further observation. Considered US/CT but did not obtain given low likelihood of diagnoses such as thrombosis or bleed. Pt is comfortable with the plan to follow up with his PCP.                                      Clinical Impression:  Final diagnoses:  [M62.838] Neck muscle spasm (Primary)          ED Disposition Condition    Discharge Stable          ED Prescriptions    None       Follow-up Information    None                [1]   Social History  Tobacco Use    Smoking status: Never     Passive exposure: Never    Smokeless tobacco: Never   Substance Use Topics    Alcohol use: Yes     Comment: socially    Drug use: No        Jas Koch MD  07/07/25 2177

## 2025-07-08 NOTE — ED TRIAGE NOTES
Onofre Sims, a 49 y.o. male presents to the ED w/ complaint of intermittent pulsating to the left side of the neck x2 days. Denies pain, denies numbness/tingling to the left arm. Denies any other coimplaints.    Triage note:  Chief Complaint   Patient presents with    Multiple complaints     Feels like pulsating to L side of neck 2 d ago ,      Review of patient's allergies indicates:  No Known Allergies  Past Medical History:   Diagnosis Date    Anxiety     Depression     Hyperlipidemia     Keratoconus

## 2025-07-09 ENCOUNTER — LAB VISIT (OUTPATIENT)
Dept: LAB | Facility: HOSPITAL | Age: 50
End: 2025-07-09
Attending: FAMILY MEDICINE
Payer: COMMERCIAL

## 2025-07-09 ENCOUNTER — OFFICE VISIT (OUTPATIENT)
Dept: UROLOGY | Facility: CLINIC | Age: 50
End: 2025-07-09
Payer: COMMERCIAL

## 2025-07-09 VITALS
HEART RATE: 66 BPM | SYSTOLIC BLOOD PRESSURE: 135 MMHG | DIASTOLIC BLOOD PRESSURE: 85 MMHG | HEIGHT: 71 IN | WEIGHT: 199.94 LBS | BODY MASS INDEX: 27.99 KG/M2

## 2025-07-09 DIAGNOSIS — R35.0 URINARY FREQUENCY: ICD-10-CM

## 2025-07-09 DIAGNOSIS — N52.9 ERECTILE DYSFUNCTION, UNSPECIFIED ERECTILE DYSFUNCTION TYPE: Primary | ICD-10-CM

## 2025-07-09 LAB
BILIRUB UR QL STRIP.AUTO: NEGATIVE
CLARITY UR: CLEAR
COLOR UR AUTO: YELLOW
GLUCOSE UR QL STRIP: NEGATIVE
HGB UR QL STRIP: NEGATIVE
KETONES UR QL STRIP: NEGATIVE
LEUKOCYTE ESTERASE UR QL STRIP: ABNORMAL
MICROSCOPIC COMMENT: NORMAL
NITRITE UR QL STRIP: NEGATIVE
PH UR STRIP: 5 [PH]
PROT UR QL STRIP: NEGATIVE
RBC #/AREA URNS AUTO: <1 /HPF (ref 0–4)
SP GR UR STRIP: 1.02
UROBILINOGEN UR STRIP-ACNC: NEGATIVE EU/DL
WBC #/AREA URNS AUTO: 1 /HPF (ref 0–5)

## 2025-07-09 PROCEDURE — 3079F DIAST BP 80-89 MM HG: CPT | Mod: CPTII,S$GLB,, | Performed by: STUDENT IN AN ORGANIZED HEALTH CARE EDUCATION/TRAINING PROGRAM

## 2025-07-09 PROCEDURE — 1159F MED LIST DOCD IN RCRD: CPT | Mod: CPTII,S$GLB,, | Performed by: STUDENT IN AN ORGANIZED HEALTH CARE EDUCATION/TRAINING PROGRAM

## 2025-07-09 PROCEDURE — 3008F BODY MASS INDEX DOCD: CPT | Mod: CPTII,S$GLB,, | Performed by: STUDENT IN AN ORGANIZED HEALTH CARE EDUCATION/TRAINING PROGRAM

## 2025-07-09 PROCEDURE — 51741 ELECTRO-UROFLOWMETRY FIRST: CPT | Mod: S$GLB,,, | Performed by: STUDENT IN AN ORGANIZED HEALTH CARE EDUCATION/TRAINING PROGRAM

## 2025-07-09 PROCEDURE — 3075F SYST BP GE 130 - 139MM HG: CPT | Mod: CPTII,S$GLB,, | Performed by: STUDENT IN AN ORGANIZED HEALTH CARE EDUCATION/TRAINING PROGRAM

## 2025-07-09 PROCEDURE — 99214 OFFICE O/P EST MOD 30 MIN: CPT | Mod: 25,S$GLB,, | Performed by: STUDENT IN AN ORGANIZED HEALTH CARE EDUCATION/TRAINING PROGRAM

## 2025-07-09 PROCEDURE — 99999 PR PBB SHADOW E&M-EST. PATIENT-LVL III: CPT | Mod: PBBFAC,,, | Performed by: STUDENT IN AN ORGANIZED HEALTH CARE EDUCATION/TRAINING PROGRAM

## 2025-07-09 PROCEDURE — 81001 URINALYSIS AUTO W/SCOPE: CPT

## 2025-07-10 LAB — HOLD SPECIMEN: NORMAL

## 2025-07-10 NOTE — PROGRESS NOTES
It was great to see Timothy today.    Onofre Sims is a pleasant 49 y.o. male presenting for management of LUTS.     History of Present Illness     - Patient reports intermittent urinary symptoms with the need to urinate again within 1-2 hours after emptying his bladder during the day, but not at night. He describes a sensation of incomplete bladder emptying during these episodes, occurring once or twice daily.   -He urinates approximately 3-5 times during the day from wake-up until bedtime, and occasionally wakes up once at night to urinate, particularly after alcohol consumption. He denies any issues with his urinary stream, stating that it is adequate. He was last evaluated by Dr. Cavanaugh in April 2023 for similar concerns, where it was unclear if he had overactive bladder or if symptoms were related to stress.   - He mentions a history of prostate infections several times in the last 15-20 years, but states his condition has resolved.  - He denies constipation, frequent nighttime urination, slow urinary stream, and current issues with erectile dysfunction.  - He has previously used viagra for ED, but is not currently requiring the medication for adequate function.     SOCIAL HISTORY:  - Alcohol: Drinks daiquiris once a week or once every other week    MEDICAL HISTORY:  - Overactive bladder: Suspected in April 2023, unclear diagnosis  - Erectile dysfunction: Past history, not currently taking medication  - Prostate infection: Multiple occurrences in the past 15-20 years    FAMILY HISTORY:  - No family history of prostate cancer    TEST RESULTS:  - PSA: 2021, normal results  - Uroflow test: Today, 149 mL voided, 2 mL post-void residual, peak flow 10.5 mL/s (described as mildly reduced)          Past Medical History:   Diagnosis Date    Anxiety     Depression     Hyperlipidemia     Keratoconus       Past Surgical History:   Procedure Laterality Date    BREAST SURGERY      Mc [place], enlargement, not cancer     COLONOSCOPY N/A 11/1/2023    Procedure: COLONOSCOPY;  Surgeon: Keith Rodriguez MD;  Location: [facility];  Service: Endoscopy;  Laterality: N/A;       Pertinent labs and imaging independently reviewed include:   Lab Results   Component Value Date     06/19/2025    K 4.7 06/19/2025    CREATININE 1.3 06/19/2025    EGFRNORACEVR >60 06/19/2025     Lab Results   Component Value Date    HGBA1C 4.7 01/19/2024      Lab Results   Component Value Date    PSA 0.11 07/09/2025    PSA 0.11 04/23/2024    PSA 0.16 12/20/2022            ROS: as per HPI    Tobacco Use History[1]     Physical Exam     General: No acute distress. Nontoxic appearing.  HENT: Normocephalic. Atraumatic.  Respiratory: Normal respiratory effort. No conversational dyspnea. No audible wheezing.  Abdomen: No obvious distension.  Skin: No visible abnormalities.  Extremities: No edema upper extremities. No edema lower extremities.  Neurological: Alert and oriented x3. Normal speech.  Psychiatric: Normal mood. Normal affect. No evidence of SI.        Problems addressed during today's encounter  1. Erectile dysfunction, unspecified erectile dysfunction type    2. Urinary frequency  -     Ambulatory referral/consult to Urology         Plan for problems listed above includes:   Assessment & Plan    FREQUENCY OF MICTURITION:   Assessed current urinary symptoms, including frequency and nocturia.   Urinary frequency (3-5 times/day) WNL.    Discussed impact of fluid intake, including caffeine and alcohol, on urinary patterns.     OTHER PROBLEMS RELATED TO LIFESTYLE:   Reviewed history, noting previous visit with Dr. Cavanaugh in April 2023 for possible overactive bladder symptoms related to stress.   Symptoms likely related to fluid intake patterns rather than overactive bladder or incomplete emptying.    PERSONAL HISTORY OF OTHER DISEASES OF MALE GENITAL ORGANS:   PSA screening history shows consistent normal results since 2021.   Explained nature of PSA screening  as blood test for prostate cancer detection.   Contact office if concerned about prostate infection symptoms.   - May continue Prn 25mg sildenafil if he feels he needs it in the future.          This note was generated with the assistance of ambient listening technology. Verbal consent was obtained by the patient and accompanying visitor(s) for the recording of patient appointment to facilitate this note. I attest to having reviewed and edited the generated note for accuracy, though some syntax or spelling errors may persist. Please contact the author of this note for any clarification.     Luther Hernandez MD           [1]   Social History  Tobacco Use   Smoking Status Never    Passive exposure: Never   Smokeless Tobacco Never